# Patient Record
Sex: FEMALE | Race: BLACK OR AFRICAN AMERICAN | Employment: FULL TIME | URBAN - METROPOLITAN AREA
[De-identification: names, ages, dates, MRNs, and addresses within clinical notes are randomized per-mention and may not be internally consistent; named-entity substitution may affect disease eponyms.]

---

## 2017-01-03 ENCOUNTER — HOSPITAL ENCOUNTER (EMERGENCY)
Age: 32
Discharge: HOME | End: 2017-01-03
Attending: EMERGENCY MEDICINE
Payer: COMMERCIAL

## 2017-01-03 ENCOUNTER — PATIENT OUTREACH (OUTPATIENT)
Dept: FAMILY MEDICINE CLINIC | Age: 32
End: 2017-01-03

## 2017-01-03 ENCOUNTER — APPOINTMENT (OUTPATIENT)
Dept: GENERAL RADIOLOGY | Age: 32
End: 2017-01-03
Attending: PHYSICIAN ASSISTANT
Payer: COMMERCIAL

## 2017-01-03 VITALS
OXYGEN SATURATION: 98 % | TEMPERATURE: 98 F | BODY MASS INDEX: 30.7 KG/M2 | DIASTOLIC BLOOD PRESSURE: 68 MMHG | HEART RATE: 62 BPM | WEIGHT: 191.06 LBS | SYSTOLIC BLOOD PRESSURE: 107 MMHG | RESPIRATION RATE: 20 BRPM | HEIGHT: 66 IN

## 2017-01-03 DIAGNOSIS — R07.9 CHEST PAIN, UNSPECIFIED TYPE: Primary | ICD-10-CM

## 2017-01-03 LAB
ALBUMIN SERPL BCP-MCNC: 3.7 G/DL (ref 3.5–5)
ALBUMIN/GLOB SERPL: 1.1 {RATIO} (ref 1.1–2.2)
ALP SERPL-CCNC: 48 U/L (ref 45–117)
ALT SERPL-CCNC: 17 U/L (ref 12–78)
ANION GAP BLD CALC-SCNC: 5 MMOL/L (ref 5–15)
AST SERPL W P-5'-P-CCNC: 9 U/L (ref 15–37)
ATRIAL RATE: 69 BPM
BASOPHILS # BLD AUTO: 0 K/UL (ref 0–0.1)
BASOPHILS # BLD: 0 % (ref 0–1)
BILIRUB SERPL-MCNC: 0.3 MG/DL (ref 0.2–1)
BUN SERPL-MCNC: 8 MG/DL (ref 6–20)
BUN/CREAT SERPL: 10 (ref 12–20)
CALCIUM SERPL-MCNC: 8.6 MG/DL (ref 8.5–10.1)
CALCULATED P AXIS, ECG09: 34 DEGREES
CALCULATED R AXIS, ECG10: 9 DEGREES
CALCULATED T AXIS, ECG11: 15 DEGREES
CHLORIDE SERPL-SCNC: 105 MMOL/L (ref 97–108)
CO2 SERPL-SCNC: 29 MMOL/L (ref 21–32)
CREAT SERPL-MCNC: 0.79 MG/DL (ref 0.55–1.02)
DIAGNOSIS, 93000: NORMAL
EOSINOPHIL # BLD: 0 K/UL (ref 0–0.4)
EOSINOPHIL NFR BLD: 0 % (ref 0–7)
ERYTHROCYTE [DISTWIDTH] IN BLOOD BY AUTOMATED COUNT: 13.1 % (ref 11.5–14.5)
GLOBULIN SER CALC-MCNC: 3.5 G/DL (ref 2–4)
GLUCOSE SERPL-MCNC: 82 MG/DL (ref 65–100)
HCG UR QL: NEGATIVE
HCT VFR BLD AUTO: 36.2 % (ref 35–47)
HGB BLD-MCNC: 12.5 G/DL (ref 11.5–16)
LYMPHOCYTES # BLD AUTO: 37 % (ref 12–49)
LYMPHOCYTES # BLD: 2.6 K/UL (ref 0.8–3.5)
MCH RBC QN AUTO: 27.4 PG (ref 26–34)
MCHC RBC AUTO-ENTMCNC: 34.5 G/DL (ref 30–36.5)
MCV RBC AUTO: 79.2 FL (ref 80–99)
MONOCYTES # BLD: 0.3 K/UL (ref 0–1)
MONOCYTES NFR BLD AUTO: 4 % (ref 5–13)
NEUTS SEG # BLD: 4 K/UL (ref 1.8–8)
NEUTS SEG NFR BLD AUTO: 59 % (ref 32–75)
P-R INTERVAL, ECG05: 170 MS
PLATELET # BLD AUTO: 254 K/UL (ref 150–400)
POTASSIUM SERPL-SCNC: 3.2 MMOL/L (ref 3.5–5.1)
PROT SERPL-MCNC: 7.2 G/DL (ref 6.4–8.2)
Q-T INTERVAL, ECG07: 416 MS
QRS DURATION, ECG06: 80 MS
QTC CALCULATION (BEZET), ECG08: 445 MS
RBC # BLD AUTO: 4.57 M/UL (ref 3.8–5.2)
SODIUM SERPL-SCNC: 139 MMOL/L (ref 136–145)
TROPONIN I SERPL-MCNC: <0.04 NG/ML
VENTRICULAR RATE, ECG03: 69 BPM
WBC # BLD AUTO: 6.9 K/UL (ref 3.6–11)

## 2017-01-03 PROCEDURE — 93005 ELECTROCARDIOGRAM TRACING: CPT

## 2017-01-03 PROCEDURE — 71020 XR CHEST PA LAT: CPT

## 2017-01-03 PROCEDURE — 84484 ASSAY OF TROPONIN QUANT: CPT | Performed by: EMERGENCY MEDICINE

## 2017-01-03 PROCEDURE — 81025 URINE PREGNANCY TEST: CPT

## 2017-01-03 PROCEDURE — 80053 COMPREHEN METABOLIC PANEL: CPT | Performed by: EMERGENCY MEDICINE

## 2017-01-03 PROCEDURE — 85025 COMPLETE CBC W/AUTO DIFF WBC: CPT | Performed by: EMERGENCY MEDICINE

## 2017-01-03 PROCEDURE — 96374 THER/PROPH/DIAG INJ IV PUSH: CPT

## 2017-01-03 PROCEDURE — 99285 EMERGENCY DEPT VISIT HI MDM: CPT

## 2017-01-03 PROCEDURE — 36415 COLL VENOUS BLD VENIPUNCTURE: CPT | Performed by: EMERGENCY MEDICINE

## 2017-01-03 PROCEDURE — 74011250636 HC RX REV CODE- 250/636: Performed by: PHYSICIAN ASSISTANT

## 2017-01-03 RX ORDER — KETOROLAC TROMETHAMINE 30 MG/ML
30 INJECTION, SOLUTION INTRAMUSCULAR; INTRAVENOUS
Status: COMPLETED | OUTPATIENT
Start: 2017-01-03 | End: 2017-01-03

## 2017-01-03 RX ADMIN — KETOROLAC TROMETHAMINE 30 MG: 30 INJECTION, SOLUTION INTRAMUSCULAR at 10:32

## 2017-01-03 NOTE — ED NOTES
Patient discharged home by [facility] PAC. Work note and discharge instructions given by PAC. Patient verbalized understanding of discharge instructions.

## 2017-01-03 NOTE — DISCHARGE INSTRUCTIONS
We hope that we have addressed all of your medical concerns. The examination and treatment you received in the Emergency Department were for an emergent problem and were not intended as complete care. It is important that you follow up with your healthcare provider(s) for ongoing care. If your symptoms worsen or do not improve as expected, and you are unable to reach your usual health care provider(s), you should return to the Emergency Department. Today's healthcare is undergoing tremendous change, and patient satisfaction surveys are one of the many tools to assess the quality of medical care. You may receive a survey from the [employer] regarding your experience in the Emergency Department. I hope that your experience has been completely positive, particularly the medical care that I provided. As such, please participate in the survey; anything less than excellent does not meet my expectations or intentions. Formerly [facility], later [facility] and [address] [place] [address] participate in nationally recognized quality of care measures. If your blood pressure is greater than 120/80, as reported below, we urge that you seek medical care to address the potential of high blood pressure, commonly known as hypertension. Hypertension can be hereditary or can be caused by certain medical conditions, pain, stress, or \"white coat syndrome. \"       Please make an appointment with your health care provider(s) for follow up of your Emergency Department visit. VITALS:   Patient Vitals for the past 8 hrs:   Temp Pulse Resp BP SpO2   01/03/17 0930 98.6 °F (37 °C) 72 16 128/77 99 %          Thank you for allowing us to provide you with medical care today. We realize that you have many choices for your emergency care needs. Please choose us in the future for any continued health care needs. Jackie Houston  [address] [place] [address] 20.   Office: [phone]            Recent Results (from the past 24 hour(s))   EKG, 12 LEAD, INITIAL    Collection Time: 01/03/17  9:38 AM   Result Value Ref Range    Ventricular Rate 69 BPM    Atrial Rate 69 BPM    P-R Interval 170 ms    QRS Duration 80 ms    Q-T Interval 416 ms    QTC Calculation (Bezet) 445 ms    Calculated P Axis 34 degrees    Calculated R Axis 9 degrees    Calculated T Axis 15 degrees    Diagnosis       Normal sinus rhythm with sinus arrhythmia  No previous ECGs available     CBC WITH AUTOMATED DIFF    Collection Time: 01/03/17  9:45 AM   Result Value Ref Range    WBC 6.9 3.6 - 11.0 K/uL    RBC 4.57 3.80 - 5.20 M/uL    HGB 12.5 11.5 - 16.0 g/dL    HCT 36.2 35.0 - 47.0 %    MCV 79.2 (L) 80.0 - 99.0 FL    MCH 27.4 26.0 - 34.0 PG    MCHC 34.5 30.0 - 36.5 g/dL    RDW 13.1 11.5 - 14.5 %    PLATELET 364 330 - 078 K/uL    NEUTROPHILS 59 32 - 75 %    LYMPHOCYTES 37 12 - 49 %    MONOCYTES 4 (L) 5 - 13 %    EOSINOPHILS 0 0 - 7 %    BASOPHILS 0 0 - 1 %    ABS. NEUTROPHILS 4.0 1.8 - 8.0 K/UL    ABS. LYMPHOCYTES 2.6 0.8 - 3.5 K/UL    ABS. MONOCYTES 0.3 0.0 - 1.0 K/UL    ABS. EOSINOPHILS 0.0 0.0 - 0.4 K/UL    ABS. BASOPHILS 0.0 0.0 - 0.1 K/UL   METABOLIC PANEL, COMPREHENSIVE    Collection Time: 01/03/17  9:45 AM   Result Value Ref Range    Sodium 139 136 - 145 mmol/L    Potassium 3.2 (L) 3.5 - 5.1 mmol/L    Chloride 105 97 - 108 mmol/L    CO2 29 21 - 32 mmol/L    Anion gap 5 5 - 15 mmol/L    Glucose 82 65 - 100 mg/dL    BUN 8 6 - 20 MG/DL    Creatinine 0.79 0.55 - 1.02 MG/DL    BUN/Creatinine ratio 10 (L) 12 - 20      GFR est AA >60 >60 ml/min/1.73m2    GFR est non-AA >60 >60 ml/min/1.73m2    Calcium 8.6 8.5 - 10.1 MG/DL    Bilirubin, total 0.3 0.2 - 1.0 MG/DL    ALT 17 12 - 78 U/L    AST 9 (L) 15 - 37 U/L    Alk.  phosphatase 48 45 - 117 U/L    Protein, total 7.2 6.4 - 8.2 g/dL    Albumin 3.7 3.5 - 5.0 g/dL    Globulin 3.5 2.0 - 4.0 g/dL    A-G Ratio 1.1 1.1 - 2.2     TROPONIN I    Collection Time: 01/03/17  9:45 AM Result Value Ref Range    Troponin-I, Qt. <0.04 <0.05 ng/mL   HCG URINE, QL. - POC    Collection Time: 01/03/17 10:35 AM   Result Value Ref Range    Pregnancy test,urine (POC) NEGATIVE  NEG         Xr Chest Pa Lat    Result Date: 1/3/2017  Exam:  2 view chest Indication: Left-sided chest pain for 2 weeks PA and lateral views demonstrate normal heart size. There is no acute process in the lung fields. The osseous structures are unremarkable. Impression: No acute process. Musculoskeletal Chest Pain: Care Instructions  Your Care Instructions  Chest pain is not always a sign that something is wrong with your heart or that you have another serious problem. The doctor thinks your chest pain is caused by strained muscles or ligaments, inflamed chest cartilage, or another problem in your chest, rather than by your heart. You may need more tests to find the cause of your chest pain. Follow-up care is a key part of your treatment and safety. Be sure to make and go to all appointments, and call your doctor if you are having problems. Its also a good idea to know your test results and keep a list of the medicines you take. How can you care for yourself at home? · Take pain medicines exactly as directed. ¨ If the doctor gave you a prescription medicine for pain, take it as prescribed. ¨ If you are not taking a prescription pain medicine, ask your doctor if you can take an over-the-counter medicine. · Rest and protect the sore area. · Stop, change, or take a break from any activity that may be causing your pain or soreness. · Put ice or a cold pack on the sore area for 10 to 20 minutes at a time. Try to do this every 1 to 2 hours for the next 3 days (when you are awake) or until the swelling goes down. Put a thin cloth between the ice and your skin. · After 2 or 3 days, apply a heating pad set on low or a warm cloth to the area that hurts.  Some doctors suggest that you go back and forth between hot and cold.  · Do not wrap or tape your ribs for support. This may cause you to take smaller breaths, which could increase your risk of lung problems. · Mentholated creams such as Bengay or Icy Hot may soothe sore muscles. Follow the instructions on the package. · Follow your doctor's instructions for exercising. · Gentle stretching and massage may help you get better faster. Stretch slowly to the point just before pain begins, and hold the stretch for at least 15 to 30 seconds. Do this 3 or 4 times a day. Stretch just after you have applied heat. · As your pain gets better, slowly return to your normal activities. Any increased pain may be a sign that you need to rest a while longer. When should you call for help? Call 911 anytime you think you may need emergency care. For example, call if:  · You have chest pain or pressure. This may occur with:  ¨ Sweating. ¨ Shortness of breath. ¨ Nausea or vomiting. ¨ Pain that spreads from the chest to the neck, jaw, or one or both shoulders or arms. ¨ Dizziness or lightheadedness. ¨ A fast or uneven pulse. After calling 911, chew 1 adult-strength aspirin. Wait for an ambulance. Do not try to drive yourself. · You have sudden chest pain and shortness of breath, or you cough up blood. Call your doctor now or seek immediate medical care if:  · You have any trouble breathing. · Your chest pain gets worse. · Your chest pain occurs consistently with exercise and is relieved by rest.  Watch closely for changes in your health, and be sure to contact your doctor if:  · Your chest pain does not get better after 1 week. Where can you learn more? Go to http://janette-michelle.info/. Enter V293 in the search box to learn more about \"Musculoskeletal Chest Pain: Care Instructions. \"  Current as of: May 27, 2016  Content Version: 11.1  © 6581-0678 [employer].  Care instructions adapted under license by [employer] (which disclaims liability or warranty for this information). If you have questions about a medical condition or this instruction, always ask your healthcare professional. [place] [address] any warranty or liability for your use of this information.

## 2017-01-03 NOTE — ED TRIAGE NOTES
Left sided chest pain for a few weeks, pt stated she has been under a lot of stress, denies fever, denies cough, denies n/v, some sob, denies long vehicle rides, denies leg/calf pain

## 2017-01-03 NOTE — ED PROVIDER NOTES
HPI Comments: 31 y/o F hx anxiety, depression presents with L side cp, constant for the past 'few weeks' worsened by moving L UE, pressing area, no trauma or cough. No tobacco or OCP. Patient has hx 'murmur' since childhood, not currently followed by cardiology. No personal/fam hx dvt/pe. Patient currently taking no medications. No recent travel, no LE swelling/pain. Denies sob/exertional sob or exertional cp. Patient is a 32 y.o. female presenting with chest pain. Chest Pain (Angina)           Past Medical History:   Diagnosis Date    Heart murmur     History of      Preeclampsia        Past Surgical History:   Procedure Laterality Date    Hx wisdom teeth extraction      Hx  section      Hx tubal ligation Bilateral 2015         Family History:   Problem Relation Age of Onset    Hypertension Mother     Diabetes Father     Arthritis-osteo Maternal Aunt     Stroke Maternal Grandmother        Social History     Social History    Marital status:      Spouse name: N/A    Number of children: N/A    Years of education: N/A     Occupational History    Not on file. Social History Main Topics    Smoking status: Never Smoker    Smokeless tobacco: Never Used    Alcohol use 0.0 oz/week     1 - 2 Standard drinks or equivalent per week      Comment: RARE    Drug use: No    Sexual activity: Yes     Partners: Male     Birth control/ protection: None, Pill, Surgical      Comment:      Other Topics Concern    Not on file     Social History Narrative         ALLERGIES: Review of patient's allergies indicates no known allergies. Review of Systems   Cardiovascular: Positive for chest pain. All other systems reviewed and are negative.       Vitals:    17 0930   BP: 128/77   Pulse: 72   Resp: 16   Temp: 98.6 °F (37 °C)   SpO2: 99%   Weight: 86.7 kg (191 lb 1 oz)   Height: 5' 6\" (1.676 m)            Physical Exam   Constitutional: She is oriented to person, place, and time. She appears well-developed and well-nourished. HENT:   Head: Normocephalic and atraumatic. Eyes: Conjunctivae and EOM are normal.   Neck: Normal range of motion. Neck supple. Cardiovascular: Normal rate and regular rhythm. Murmur heard. Diastolic murmur is present with a grade of 2/6   Pulmonary/Chest: Effort normal and breath sounds normal. She exhibits tenderness. Normal visual inspection    Abdominal: Soft. Bowel sounds are normal. There is no tenderness. Musculoskeletal: Normal range of motion. She exhibits no edema or tenderness. Neurological: She is alert and oriented to person, place, and time. Skin: Skin is warm and dry. No rash noted. No erythema. Nursing note and vitals reviewed. MDM  Number of Diagnoses or Management Options  Chest pain, unspecified type:   Diagnosis management comments: 33 y/o F constant cp x'weeks', reproducible - ekg/labs/cxr/toradol    Wells/PERC - neg    Sx reproducible, ttp/movement L UE, unremarkable visual inspection, no pleuritic component    Sx improved following toradol    W/u reassuring, cp constant x weeks, no associated sx, ok for home with cardiology f/u given murmur that has not been re evaluated although appears benign     ED EKG interpretation:  Rhythm: normal sinus rhythm; and regular . Rate (approx.): 69; Axis: normal; P wave: normal; QRS interval: normal ; ST/T wave: normal; in  Lead: ; Other findings: normal. This EKG was interpreted by DEBBIE Villegas,ED Provider.       ED Course       Procedures

## 2017-01-03 NOTE — LETTER
1/4/2017 3:04 PM 
 
[facility]. [facility]y [facility] [phone] Dear Ms. Jakub Green: 
 
I am a Nurse Navigator working with your physician's office. Part of my job is to follow-up with patients who have been in the emergency department or hospital to see how they are feeling, answer any questions they may have about their visit and also make sure they have a follow-up appointment to see their primary care doctor. I can also provide resources to patient that have other needs. Please call me if you need assistance with affording food, medications, if you need transportation to physician appointments or if there are other needs you might have. Thank you for allowing me to participate in your care. Sincerely, 
 
 
 
Boy Gómez. Kalyani Gibson, BSN RNC Nurse Navigator [phone]

## 2017-01-03 NOTE — PROGRESS NOTES
Per LPN in clinic the pt. Called on call this morning to report left sided CP and that she was going to the ER at [facility]. Per ER record, the pt. Had left sided CP x 3 weeks. Troponin, EKG and other tests wnl. DX: chest wall pain and given the name of Dr. Uzma Montero to follow up with. NN will contact the pt. Regarding f/u in this office. Benedict Pierson. BROOKLYNN Albarran       1/4/17 1500 [facility] with ED visit as above. Dx with Chest Pain unspecified. W/U negative, EKG wnl, ok to discharge per MD to home with Cardiology follow up. Given Toradol IM x 1. To follow up with Dr. Uzma Montero, Cardiology. 1503: Call to pt. No answer, no answer machine. Sending letter. Benedict Pierson.  BROOKLYNN Albarran

## 2017-01-03 NOTE — LETTER
NOTIFICATION RETURN TO WORK / SCHOOL 
 
1/3/2017 10:51 AM 
 
Ms. Kelsey Ellington [facility] 7 [phone] To Whom It May Concern: 
 
Kelsey Ellington is currently under the care of [facility]. Please excuse from work 1/3/16. Sincerely, 
 
 
John Jo

## 2017-02-15 ENCOUNTER — HOSPITAL ENCOUNTER (EMERGENCY)
Age: 32
Discharge: HOME | End: 2017-02-15
Attending: EMERGENCY MEDICINE
Payer: COMMERCIAL

## 2017-02-15 ENCOUNTER — APPOINTMENT (OUTPATIENT)
Dept: GENERAL RADIOLOGY | Age: 32
End: 2017-02-15
Attending: EMERGENCY MEDICINE
Payer: COMMERCIAL

## 2017-02-15 VITALS
TEMPERATURE: 98.4 F | DIASTOLIC BLOOD PRESSURE: 91 MMHG | SYSTOLIC BLOOD PRESSURE: 136 MMHG | BODY MASS INDEX: 32.36 KG/M2 | WEIGHT: 194.22 LBS | HEIGHT: 65 IN | OXYGEN SATURATION: 96 % | HEART RATE: 64 BPM | RESPIRATION RATE: 18 BRPM

## 2017-02-15 DIAGNOSIS — R07.89 ATYPICAL CHEST PAIN: Primary | ICD-10-CM

## 2017-02-15 LAB
ALBUMIN SERPL BCP-MCNC: 3.8 G/DL (ref 3.5–5)
ALBUMIN/GLOB SERPL: 1 {RATIO} (ref 1.1–2.2)
ALP SERPL-CCNC: 60 U/L (ref 45–117)
ALT SERPL-CCNC: 13 U/L (ref 12–78)
ANION GAP BLD CALC-SCNC: 6 MMOL/L (ref 5–15)
AST SERPL W P-5'-P-CCNC: 11 U/L (ref 15–37)
BASOPHILS # BLD AUTO: 0 K/UL (ref 0–0.1)
BASOPHILS # BLD: 1 % (ref 0–1)
BILIRUB SERPL-MCNC: 0.2 MG/DL (ref 0.2–1)
BUN SERPL-MCNC: 11 MG/DL (ref 6–20)
BUN/CREAT SERPL: 16 (ref 12–20)
CALCIUM SERPL-MCNC: 8.6 MG/DL (ref 8.5–10.1)
CHLORIDE SERPL-SCNC: 105 MMOL/L (ref 97–108)
CK SERPL-CCNC: 97 U/L (ref 26–192)
CO2 SERPL-SCNC: 30 MMOL/L (ref 21–32)
CREAT SERPL-MCNC: 0.7 MG/DL (ref 0.55–1.02)
D DIMER PPP FEU-MCNC: 0.31 MG/L FEU (ref 0–0.65)
EOSINOPHIL # BLD: 0.1 K/UL (ref 0–0.4)
EOSINOPHIL NFR BLD: 1 % (ref 0–7)
ERYTHROCYTE [DISTWIDTH] IN BLOOD BY AUTOMATED COUNT: 13.1 % (ref 11.5–14.5)
GLOBULIN SER CALC-MCNC: 3.7 G/DL (ref 2–4)
GLUCOSE SERPL-MCNC: 75 MG/DL (ref 65–100)
HCG UR QL: NEGATIVE
HCT VFR BLD AUTO: 32.8 % (ref 35–47)
HGB BLD-MCNC: 11.4 G/DL (ref 11.5–16)
LYMPHOCYTES # BLD AUTO: 37 % (ref 12–49)
LYMPHOCYTES # BLD: 2 K/UL (ref 0.8–3.5)
MCH RBC QN AUTO: 27.3 PG (ref 26–34)
MCHC RBC AUTO-ENTMCNC: 34.8 G/DL (ref 30–36.5)
MCV RBC AUTO: 78.7 FL (ref 80–99)
MONOCYTES # BLD: 0.3 K/UL (ref 0–1)
MONOCYTES NFR BLD AUTO: 6 % (ref 5–13)
NEUTS SEG # BLD: 3 K/UL (ref 1.8–8)
NEUTS SEG NFR BLD AUTO: 55 % (ref 32–75)
PLATELET # BLD AUTO: 230 K/UL (ref 150–400)
POTASSIUM SERPL-SCNC: 3.8 MMOL/L (ref 3.5–5.1)
PROT SERPL-MCNC: 7.5 G/DL (ref 6.4–8.2)
RBC # BLD AUTO: 4.17 M/UL (ref 3.8–5.2)
SODIUM SERPL-SCNC: 141 MMOL/L (ref 136–145)
TROPONIN I SERPL-MCNC: <0.04 NG/ML
WBC # BLD AUTO: 5.4 K/UL (ref 3.6–11)

## 2017-02-15 PROCEDURE — 71020 XR CHEST PA LAT: CPT

## 2017-02-15 PROCEDURE — 82550 ASSAY OF CK (CPK): CPT | Performed by: EMERGENCY MEDICINE

## 2017-02-15 PROCEDURE — 80053 COMPREHEN METABOLIC PANEL: CPT | Performed by: EMERGENCY MEDICINE

## 2017-02-15 PROCEDURE — 93005 ELECTROCARDIOGRAM TRACING: CPT

## 2017-02-15 PROCEDURE — 99284 EMERGENCY DEPT VISIT MOD MDM: CPT

## 2017-02-15 PROCEDURE — 36415 COLL VENOUS BLD VENIPUNCTURE: CPT | Performed by: EMERGENCY MEDICINE

## 2017-02-15 PROCEDURE — 81025 URINE PREGNANCY TEST: CPT

## 2017-02-15 PROCEDURE — 85025 COMPLETE CBC W/AUTO DIFF WBC: CPT | Performed by: EMERGENCY MEDICINE

## 2017-02-15 PROCEDURE — 74011250637 HC RX REV CODE- 250/637: Performed by: EMERGENCY MEDICINE

## 2017-02-15 PROCEDURE — 84484 ASSAY OF TROPONIN QUANT: CPT | Performed by: EMERGENCY MEDICINE

## 2017-02-15 PROCEDURE — 85379 FIBRIN DEGRADATION QUANT: CPT | Performed by: EMERGENCY MEDICINE

## 2017-02-15 RX ORDER — HYDROCODONE BITARTRATE AND ACETAMINOPHEN 5; 325 MG/1; MG/1
2 TABLET ORAL
Status: COMPLETED | OUTPATIENT
Start: 2017-02-15 | End: 2017-02-15

## 2017-02-15 RX ORDER — NAPROXEN 375 MG/1
375 TABLET ORAL 2 TIMES DAILY WITH MEALS
Qty: 14 TAB | Refills: 0 | Status: SHIPPED | OUTPATIENT
Start: 2017-02-15 | End: 2017-02-20 | Stop reason: ALTCHOICE

## 2017-02-15 RX ORDER — HYDROCODONE BITARTRATE AND ACETAMINOPHEN 5; 325 MG/1; MG/1
1 TABLET ORAL
Qty: 12 TAB | Refills: 0 | Status: SHIPPED | OUTPATIENT
Start: 2017-02-15 | End: 2018-01-16

## 2017-02-15 RX ORDER — METAXALONE 800 MG/1
800 TABLET ORAL 3 TIMES DAILY
Qty: 15 TAB | Refills: 0 | Status: SHIPPED | OUTPATIENT
Start: 2017-02-15 | End: 2017-02-20

## 2017-02-15 RX ADMIN — HYDROCODONE BITARTRATE AND ACETAMINOPHEN 2 TABLET: 5; 325 TABLET ORAL at 18:07

## 2017-02-15 NOTE — ED NOTES
Assumed care of patient from triage. Patient arrives with chest pain to left chest and under left breast that radiates to back. Patient reports that the chest pain woke her up from sleep. Pain increased with movement of left arm, pain also noted with palpation. Patient took ibuprofen without relief of symptoms. Denies recent travel, N/V. Patient resting comfortably in reyes stretcher at this time, MD bedside with patient.

## 2017-02-15 NOTE — LETTER
[address] 
[facility] EMERGENCY DEPT 
[address] [place] [address] [phone] [phone] Work/School Note Date: 2/15/2017 To Whom It May concern: 
 
Megan Rodriguez was seen and treated today in the emergency room by the following provider(s): 
Attending Provider: Sammi Carrero. Courtney Walton MD. Megan Rodriguez may return to work on 2/16/17. Sincerely, Sammi Carrero.  Courtney Walton MD

## 2017-02-16 ENCOUNTER — PATIENT OUTREACH (OUTPATIENT)
Dept: FAMILY MEDICINE CLINIC | Age: 32
End: 2017-02-16

## 2017-02-16 LAB
ATRIAL RATE: 82 BPM
CALCULATED P AXIS, ECG09: 50 DEGREES
CALCULATED R AXIS, ECG10: 25 DEGREES
CALCULATED T AXIS, ECG11: 35 DEGREES
DIAGNOSIS, 93000: NORMAL
P-R INTERVAL, ECG05: 158 MS
Q-T INTERVAL, ECG07: 384 MS
QRS DURATION, ECG06: 82 MS
QTC CALCULATION (BEZET), ECG08: 448 MS
VENTRICULAR RATE, ECG03: 82 BPM

## 2017-02-16 NOTE — LETTER
2/16/2017 2:31 PM 
 
Ms. Donal [facility] [phone] Dear Elizabeth Mary, 
 
I am a Nurse Navigator working with your physician's office. Part of my job is to follow up with patients who have been in the emergency department or hospital to see how they are feeling, answer any questions they may have about their visit and also make sure they have a follow-up appointment to see their primary care doctor and/or specialist. 
I saw that you had gone to [facility] 2/15 and wanted to make sure you were doing ok and that you had scheduled your follow up with one of our providers since Hope Nicolas has left our practice. Also wanted to make sure you are making an appt to see cardiologist as well per ED recommendations. Please call if I can be of any assistance, you can reach me at [facility], ext [phone]. Thank you for allowing us to participate in your care!  
 
 
Sincerely, 
 
 
Chino Wallace RN

## 2017-02-16 NOTE — PROGRESS NOTES
NNTOCED    [facility] Diogo Melida to [facility] ED 2/15 c/o chest pain on the left side that radiates around to her back. Pain worsens with moving arm, pain with palpitation. Ibuprofen not helping. EKG done-normal. Rx- Lortab 5/325 #12, sig one q 4 hours prn and Rx-Naprosyn 375 #14, one bid x 7 days and Skelaxin 800mg #10, sig one bid x 5 days. Advised f/u with pcp and needs to see cardio. Called patient, KAUSHAL on  to call NN back- contact information provided. Will continue to try and reach by phone. Letter sent as well. Patient did call back- says she feels about the same, ED said she had Pleurisy. Has not gotten the Lortab but says taking the Naprosyn and Skelaxin made her dizzy,nauseated and drowsy today. Took both at same time. Suggested she split them up. Trying taking one with dinner and see how she does. Was patient of Joseph Nieves, says works all day, suggested she call at 7am to schedule evening appointment. Says she will and thanked me for the call.

## 2017-02-16 NOTE — ED NOTES
Bedside and Verbal shift change report given to Everton Casarez RN (oncoming nurse) by Clarissa Iniguez RN (offgoing nurse). Report included the following information SBAR and ED Summary.

## 2017-02-16 NOTE — DISCHARGE INSTRUCTIONS
Chest Pain: Care Instructions  Your Care Instructions  There are many things that can cause chest pain. Some are not serious and will get better on their own in a few days. But some kinds of chest pain need more testing and treatment. Your doctor may have recommended a follow-up visit in the next 8 to 12 hours. If you are not getting better, you may need more tests or treatment. Even though your doctor has released you, you still need to watch for any problems. The doctor carefully checked you, but sometimes problems can develop later. If you have new symptoms or if your symptoms do not get better, get medical care right away. If you have worse or different chest pain or pressure that lasts more than 5 minutes or you passed out (lost consciousness), call 911 or seek other emergency help right away. A medical visit is only one step in your treatment. Even if you feel better, you still need to do what your doctor recommends, such as going to all suggested follow-up appointments and taking medicines exactly as directed. This will help you recover and help prevent future problems. How can you care for yourself at home? · Rest until you feel better. · Take your medicine exactly as prescribed. Call your doctor if you think you are having a problem with your medicine. · Do not drive after taking a prescription pain medicine. When should you call for help? Call 911 if:  · You passed out (lost consciousness). · You have severe difficulty breathing. · You have symptoms of a heart attack. These may include:  ¨ Chest pain or pressure, or a strange feeling in your chest.  ¨ Sweating. ¨ Shortness of breath. ¨ Nausea or vomiting. ¨ Pain, pressure, or a strange feeling in your back, neck, jaw, or upper belly or in one or both shoulders or arms. ¨ Lightheadedness or sudden weakness. ¨ A fast or irregular heartbeat.   After you call 911, the  may tell you to chew 1 adult-strength or 2 to 4 low-dose aspirin. Wait for an ambulance. Do not try to drive yourself. Call your doctor today if:  · You have any trouble breathing. · Your chest pain gets worse. · You are dizzy or lightheaded, or you feel like you may faint. · You are not getting better as expected. · You are having new or different chest pain. Where can you learn more? Go to http://janette-michelle.info/. Enter A120 in the search box to learn more about \"Chest Pain: Care Instructions. \"  Current as of: May 27, 2016  Content Version: 11.1  © 2466-2178 [employer]. Care instructions adapted under license by [employer] (which disclaims liability or warranty for this information). If you have questions about a medical condition or this instruction, always ask your healthcare professional. [address] any warranty or liability for your use of this information.

## 2017-02-16 NOTE — PATIENT INSTRUCTIONS
Pleurisy: Care Instructions  Your Care Instructions  Pleurisy is inflammation of the tissue that lines the inside of the chest and covers the lungs (pleura). Pleurisy is often caused by an infection, usually a virus. It also can be caused by other health problems, such as pneumonia or lupus. Pleurisy can cause sharp chest pain that gets worse when you cough or take a deep breath. You may need more tests to find out what is causing your pleurisy. Treatment depends on the cause. Pleurisy may come and go for a few days, or it may continue if the cause has not been treated. Home treatment can help ease symptoms. Follow-up care is a key part of your treatment and safety. Be sure to make and go to all appointments, and call your doctor if you are having problems. Its also a good idea to know your test results and keep a list of the medicines you take. How can you care for yourself at home? · Take an over-the-counter pain medicine, such as acetaminophen (Tylenol), ibuprofen (Advil, Motrin), or naproxen (Aleve). Read and follow all instructions on the label. · Do not take two or more pain medicines at the same time unless the doctor told you to. Many pain medicines have acetaminophen, which is Tylenol. Too much acetaminophen (Tylenol) can be harmful. · If your doctor prescribed antibiotics, take them as directed. Do not stop taking them just because you feel better. You need to take the full course of antibiotics. · Take cough medicine as directed if your doctor recommends it. · Avoid activities that make the pain worse. When should you call for help? Call 911 anytime you think you may need emergency care. For example, call if:  · You have severe trouble breathing. · You have severe chest pain. · You passed out (lost consciousness). Call your doctor now or seek immediate medical care if:  · You have a new or higher fever.   Watch closely for changes in your health, and be sure to contact your doctor if:  · You begin to cough up yellow or green mucus. · You cough up blood. · Your symptoms are not better in 3 or 4 days. Where can you learn more? Go to http://janette-michelle.info/. Enter F346 in the search box to learn more about \"Pleurisy: Care Instructions. \"  Current as of: May 23, 2016  Content Version: 11.1  © 7353-4311 [employer]. Care instructions adapted under license by [employer] (which disclaims liability or warranty for this information). If you have questions about a medical condition or this instruction, always ask your healthcare professional. [address] any warranty or liability for your use of this information.

## 2017-02-16 NOTE — ED PROVIDER NOTES
HPI Comments: Yaz Curtis is a 32 y.o. female, pmhx significant for heart murmur,  who presents ambulatory to the ED c/o sudden onset, constant,  L sided CP that radiates to the L upper back x today. Pain is exacerbated by the action of sitting up, deep inspiration, exertion and lifting her L arm. Pt reports that her pain began as she was getting up this morning. She continued to work all day despite her pain and endorses taking ibuprofen 2 hours PTA with no relief. Pt specifically denies other medical problems, using depo provera, having an IUD, leg swelling, recent travel, cough, fever, LOC, nausea, vomiting, pain radiating to the jaw, CP w/ walking, or other symptoms. PCP: Luis Mcginnis,       Social Hx: -tobacco, +EtOH (1-2 standard drinks/week), -Illicit Drugs   FHx: no pertinent family hx   Medication Allergies: none      There are no other complaints, changes, or physical findings at this time. The history is provided by the patient. Past Medical History:   Diagnosis Date    Heart murmur     History of      Preeclampsia        Past Surgical History:   Procedure Laterality Date    Hx wisdom teeth extraction      Hx  section      Hx tubal ligation Bilateral 2015         Family History:   Problem Relation Age of Onset    Hypertension Mother     Diabetes Father     Arthritis-osteo Maternal Aunt     Stroke Maternal Grandmother        Social History     Social History    Marital status:      Spouse name: N/A    Number of children: N/A    Years of education: N/A     Occupational History    Not on file.      Social History Main Topics    Smoking status: Never Smoker    Smokeless tobacco: Never Used    Alcohol use 0.0 oz/week     1 - 2 Standard drinks or equivalent per week      Comment: RARE    Drug use: No    Sexual activity: Yes     Partners: Male     Birth control/ protection: None, Pill, Surgical      Comment:      Other Topics Concern    Not on file     Social History Narrative         ALLERGIES: Review of patient's allergies indicates no known allergies. Review of Systems   Constitutional: Negative for chills and fever. HENT: Negative for congestion. Eyes: Negative for visual disturbance. Respiratory: Negative for cough and chest tightness. Cardiovascular: Positive for chest pain (L sided radaiting to the L upper back). Negative for leg swelling. CP does not radiate to jaw   Gastrointestinal: Negative for abdominal pain, nausea and vomiting. Endocrine: Negative for polyuria. Genitourinary: Negative for dysuria and frequency. Musculoskeletal: Negative for myalgias. Skin: Negative for color change. Allergic/Immunologic: Negative for immunocompromised state. Neurological: Negative for syncope and numbness. All other systems reviewed and are negative. Patient Vitals for the past 12 hrs:   Temp Pulse Resp BP SpO2   02/15/17 1806 - 64 18 (!) 136/91 96 %   02/15/17 1558 98.4 °F (36.9 °C) 79 18 143/88 98 %       Physical Exam   Nursing note and vitals reviewed.   General appearance: non-toxic, NAD  Eyes: PERRL, EOMI, conjunctiva normal, anicteric sclera  HEENT: mucous membranes moist, oropharynx is benign   Pulmonary: clear to auscultation bilaterally  Cardiac: normal rate and regular rhythm, no murmurs, gallops, or rubs, 2+ peripheral pulses, 2+ radial/distal pulses  Abdomen: soft, nontender, nondistended, bowel sounds present  MSK: no lower extremity edema, ttp posterior L thorax and anterior L chest wall that is reproducible with L arm raise and thoracic rotation   Neuro: Alert, answers questions appropriately    MDM  Number of Diagnoses or Management Options  Atypical chest pain:   Diagnosis management comments: DDx: msk CP, pleurisy, pneumonia, bronchitis, low suspicion for ACS, PE (PERC neg & d dimer neg), myopericarditis        Amount and/or Complexity of Data Reviewed  Clinical lab tests: reviewed and ordered  Tests in the radiology section of CPT®: ordered  Tests in the medicine section of CPT®: reviewed and ordered  Review and summarize past medical records: yes  Independent visualization of images, tracings, or specimens: yes    Patient Progress  Patient progress: stable        Procedures   EKG interpretation: (Preliminary) 1546  Rhythm: normal sinus rhythm; and regular . Rate (approx.): 82 bpm; Axis: normal; ID interval: normal; QRS interval: normal ; ST/T wave: no FLORECITA/STD; Written by Kristel Buenrostro ED Scribe as dictated by Yanely Purcell MD    LABORATORY TESTS:  Recent Results (from the past 12 hour(s))   EKG, 12 LEAD, INITIAL    Collection Time: 02/15/17  3:46 PM   Result Value Ref Range    Ventricular Rate 82 BPM    Atrial Rate 82 BPM    P-R Interval 158 ms    QRS Duration 82 ms    Q-T Interval 384 ms    QTC Calculation (Bezet) 448 ms    Calculated P Axis 50 degrees    Calculated R Axis 25 degrees    Calculated T Axis 35 degrees    Diagnosis       Normal sinus rhythm  Normal ECG  When compared with ECG of 03-JAN-2017 09:38,  No significant change was found     CBC WITH AUTOMATED DIFF    Collection Time: 02/15/17  5:49 PM   Result Value Ref Range    WBC 5.4 3.6 - 11.0 K/uL    RBC 4.17 3.80 - 5.20 M/uL    HGB 11.4 (L) 11.5 - 16.0 g/dL    HCT 32.8 (L) 35.0 - 47.0 %    MCV 78.7 (L) 80.0 - 99.0 FL    MCH 27.3 26.0 - 34.0 PG    MCHC 34.8 30.0 - 36.5 g/dL    RDW 13.1 11.5 - 14.5 %    PLATELET 832 249 - 429 K/uL    NEUTROPHILS 55 32 - 75 %    LYMPHOCYTES 37 12 - 49 %    MONOCYTES 6 5 - 13 %    EOSINOPHILS 1 0 - 7 %    BASOPHILS 1 0 - 1 %    ABS. NEUTROPHILS 3.0 1.8 - 8.0 K/UL    ABS. LYMPHOCYTES 2.0 0.8 - 3.5 K/UL    ABS. MONOCYTES 0.3 0.0 - 1.0 K/UL    ABS. EOSINOPHILS 0.1 0.0 - 0.4 K/UL    ABS.  BASOPHILS 0.0 0.0 - 0.1 K/UL   METABOLIC PANEL, COMPREHENSIVE    Collection Time: 02/15/17  5:49 PM   Result Value Ref Range    Sodium 141 136 - 145 mmol/L    Potassium 3.8 3.5 - 5.1 mmol/L Chloride 105 97 - 108 mmol/L    CO2 30 21 - 32 mmol/L    Anion gap 6 5 - 15 mmol/L    Glucose 75 65 - 100 mg/dL    BUN 11 6 - 20 MG/DL    Creatinine 0.70 0.55 - 1.02 MG/DL    BUN/Creatinine ratio 16 12 - 20      GFR est AA >60 >60 ml/min/1.73m2    GFR est non-AA >60 >60 ml/min/1.73m2    Calcium 8.6 8.5 - 10.1 MG/DL    Bilirubin, total 0.2 0.2 - 1.0 MG/DL    ALT (SGPT) 13 12 - 78 U/L    AST (SGOT) 11 (L) 15 - 37 U/L    Alk. phosphatase 60 45 - 117 U/L    Protein, total 7.5 6.4 - 8.2 g/dL    Albumin 3.8 3.5 - 5.0 g/dL    Globulin 3.7 2.0 - 4.0 g/dL    A-G Ratio 1.0 (L) 1.1 - 2.2     D DIMER    Collection Time: 02/15/17  5:49 PM   Result Value Ref Range    D-dimer 0.31 0.00 - 0.65 mg/L FEU   CK W/ REFLX CKMB    Collection Time: 02/15/17  5:49 PM   Result Value Ref Range    CK 97 26 - 192 U/L   TROPONIN I    Collection Time: 02/15/17  5:49 PM   Result Value Ref Range    Troponin-I, Qt. <0.04 <0.05 ng/mL   HCG URINE, QL. - POC    Collection Time: 02/15/17  5:53 PM   Result Value Ref Range    Pregnancy test,urine (POC) NEGATIVE  NEG         IMAGING RESULTS:  CXR Results  (Last 48 hours)               02/15/17 1830  XR CHEST PA LAT Final result    Impression:  IMPRESSION:    No acute cardiopulmonary disease radiographically. .  . Narrative:          INDICATION:  CP. EXAM: 2 VIEW CHEST RADIOGRAPH. COMPARISON: 1/3/2017. FINDINGS: Frontal and lateral views of the chest show clear lungs. . The heart,   mediastinum and pulmonary vasculature are stable. The bony thorax is   unremarkable for age. ..                 MEDICATIONS GIVEN:  Medications   HYDROcodone-acetaminophen (NORCO) 5-325 mg per tablet 2 Tab (2 Tabs Oral Given 2/15/17 7787)       IMPRESSION:  1. Atypical chest pain        PLAN:  1.    Discharge Medication List as of 2/15/2017  7:40 PM      START taking these medications    Details   HYDROcodone-acetaminophen (LORTAB 5-325) 5-325 mg per tablet Take 1 Tab by mouth every four (4) hours as needed for Pain (breakthrough pain). Max Daily Amount: 6 Tabs. Indications: causes drowsiness;do not drink,drive, or use machinery while taking; take with a stool softener, Print, Disp-12 Tab, R-0      naproxen (NAPROSYN) 375 mg tablet Take 1 Tab by mouth two (2) times daily (with meals) for 7 days. Indications: Pain, take with food or milk, Normal, Disp-14 Tab, R-0      metaxalone (SKELAXIN) 800 mg tablet Take 1 Tab by mouth three (3) times daily for 5 days. Indications: may cause drowsiness;do not drink,drive, or use machinery while taking., Normal, Disp-15 Tab, R-0           2. Follow-up Information     Follow up With Details Comments Contact Info    Renetta Jfefries DO Schedule an appointment as soon as possible for a visit in 3 days  [address] [facility] [address] [phone]      [facility] EMERGENCY DEPT Go in 1 day If symptoms worsen [address] [facility] [id number]  [facility] Schedule an appointment as soon as possible for a visit in 2 days As needed WITH CARDIOLOGY [address]  [address]          Return to ED if worse   DISCHARGE NOTE:  8:51 PM  The patient's results have been reviewed with family and/or caregiver. They verbally convey their understanding and agreement of the patient's signs, symptoms, diagnosis, treatment, and prognosis. They additionally agree to follow up as recommended in the discharge instructions or to return to the Emergency Room should the patient's condition change prior to their follow-up appointment. The family and/or caregiver verbally agrees with the care-plan and all of their questions have been answered. The discharge instructions have also been provided to the them along with educational information regarding the patient's diagnosis and a list of reasons why the patient would want to return to the ER prior to their follow-up appointment should their condition change.   Written by Martin Leal ED Scribe, as dictated by Chivo Mcneil MD.    This note is prepared by Martin Leal acting as scribe for MD Chivo Saavedra MD : The scribe's documentation has been prepared under my direction and personally reviewed by me in its entirety. I confirm that the note above accurately reflects all work, treatment, procedures, and medical decision making performed by me.

## 2017-02-20 ENCOUNTER — OFFICE VISIT (OUTPATIENT)
Dept: FAMILY MEDICINE CLINIC | Age: 32
End: 2017-02-20

## 2017-02-20 VITALS
RESPIRATION RATE: 18 BRPM | WEIGHT: 193 LBS | HEIGHT: 65 IN | TEMPERATURE: 98.1 F | BODY MASS INDEX: 32.15 KG/M2 | OXYGEN SATURATION: 98 % | HEART RATE: 60 BPM | DIASTOLIC BLOOD PRESSURE: 70 MMHG | SYSTOLIC BLOOD PRESSURE: 116 MMHG

## 2017-02-20 DIAGNOSIS — R07.89 ATYPICAL CHEST PAIN: Primary | ICD-10-CM

## 2017-02-20 RX ORDER — DICLOFENAC SODIUM 75 MG/1
75 TABLET, DELAYED RELEASE ORAL 2 TIMES DAILY
Qty: 30 TAB | Refills: 0 | Status: SHIPPED | OUTPATIENT
Start: 2017-02-20 | End: 2018-01-16

## 2017-02-20 NOTE — LETTER
NOTIFICATION RETURN TO WORK / SCHOOL 
 
2/20/2017 10:43 AM 
 
Ms. José Heath [facility] [phone] To Whom It May Concern: 
 
José Heath is currently under the care of ERNIE Calvo. She was seen in the office today for follow up appointment. If there are questions or concerns please have the patient contact our office.  
 
 
 
Sincerely, 
 
 
Clair Quezada NP

## 2017-02-20 NOTE — MR AVS SNAPSHOT
Visit Information Date & Time Provider Department Dept. Phone Encounter #  
 2/20/2017 10:15 AM Reyna Izquierdo  [address] [place] [phone] [id number] Upcoming Health Maintenance Date Due DTaP/Tdap/Td series (1 - Tdap) 7/8/2006 PAP AKA CERVICAL CYTOLOGY 7/8/2006 Allergies as of 2/20/2017  Review Complete On: 2/20/2017 By: Reyna Izquierdo NP No Known Allergies Current Immunizations  Never Reviewed No immunizations on file. Not reviewed this visit You Were Diagnosed With   
  
 Codes Comments Atypical chest pain    -  Primary ICD-10-CM: R07.89 ICD-9-CM: 786.59 Vitals BP Pulse Temp Resp Height(growth percentile) Weight(growth percentile) 116/70 (BP 1 Location: Left arm, BP Patient Position: Sitting) 60 98.1 °F (36.7 °C) (Oral) 18 5' 5\" (1.651 m) 193 lb (87.5 kg) LMP SpO2 BMI OB Status Smoking Status 02/09/2017 (Approximate) 98% 32.12 kg/m2 Having regular periods Never Smoker Vitals History BMI and BSA Data Body Mass Index Body Surface Area  
 32.12 kg/m 2 2 m 2 Preferred Pharmacy Pharmacy Name Phone [facility] [address] [phone] Your Updated Medication List  
  
   
This list is accurate as of: 2/20/17 10:44 AM.  Always use your most recent med list.  
  
  
  
  
 diclofenac EC 75 mg EC tablet Commonly known as:  VOLTAREN Take 1 Tab by mouth two (2) times a day. HYDROcodone-acetaminophen 5-325 mg per tablet Commonly known as:  LORTAB 5-325 Take 1 Tab by mouth every four (4) hours as needed for Pain (breakthrough pain). Max Daily Amount: 6 Tabs. Indications: causes drowsiness;do not drink,drive, or use machinery while taking; take with a stool softener  
  
 metaxalone 800 mg tablet Commonly known as:  SKELAXIN  
 Take 1 Tab by mouth three (3) times daily for 5 days. Indications: may cause drowsiness;do not drink,drive, or use machinery while taking. Prescriptions Sent to Pharmacy Refills  
 diclofenac EC (VOLTAREN) 75 mg EC tablet 0 Sig: Take 1 Tab by mouth two (2) times a day. Class: Normal  
 Pharmacy: [facility] [address] [place] [address], [address]  #: [phone] Route: Oral  
  
Introducing [facility] SERVICES! Dear Elina Croft: Thank you for requesting a [employer] account. Our records indicate that you already have an active [employer] account. You can access your account anytime at [email or web address] Did you know that you can access your hospital and ER discharge instructions at any time in [employer]? You can also review all of your test results from your hospital stay or ER visit. Additional Information If you have questions, please visit the Frequently Asked Questions section of the [employer] website at [email or web address] Remember, [employer] is NOT to be used for urgent needs. For medical emergencies, dial 911. Now available from your iPhone and Android! Please provide this summary of care documentation to your next provider. Your primary care clinician is listed as Tejas Meyer. If you have any questions after today's visit, please call [phone].

## 2017-02-20 NOTE — PROGRESS NOTES
Chief Complaint   Patient presents with   [facility] Follow Up     went to [facility] 02/15/17 for chest pain, dx pleurisy and pneumonia. Given Lortab,Naproxen, and Skelaxin . Making her sick on her stomach and pain continues       Reviewed Record in preparation for visit and have obtained necessary documentation. Identified pt with two pt identifiers (Name @ )    Health Maintenance Due   Topic    DTaP/Tdap/Td series (1 - Tdap)    PAP AKA CERVICAL CYTOLOGY     INFLUENZA AGE 9 TO ADULT          1. Have you been to the ER, urgent care clinic since your last visit? Hospitalized since your last visit?see todays encounter    2. Have you seen or consulted any other health care providers outside of the [address] [place] [address] since your last visit? Include any pap smears or colon screening.  No

## 2017-02-20 NOTE — PROGRESS NOTES
HISTORY OF PRESENT ILLNESS  Colby Villalpando is a 32 y.o. female. HPI  Follow up ED visit. Patient seen at [facility] ED on 2/15/17 with c/o left side pain radiating to left anterior chest and left mid back. Records reviewed and summarized as follows:  Chest xray was negative and EKG normal without acute changes. She was diagnosed with pleurisy and put on norco, naproxen and skelaxin. Tried to take medications but they made her nauseous. Continues to have pain but sx have improved. Aggravated by deep breaths and upper arm movements. Began with cough a few days ago which exacerbates sx as well. Patient Active Problem List   Diagnosis Code    Mixed anxiety and depressive disorder F41.8     Current Outpatient Prescriptions   Medication Sig    diclofenac EC (VOLTAREN) 75 mg EC tablet Take 1 Tab by mouth two (2) times a day.  HYDROcodone-acetaminophen (LORTAB 5-325) 5-325 mg per tablet Take 1 Tab by mouth every four (4) hours as needed for Pain (breakthrough pain). Max Daily Amount: 6 Tabs. Indications: causes drowsiness;do not drink,drive, or use machinery while taking; take with a stool softener    metaxalone (SKELAXIN) 800 mg tablet Take 1 Tab by mouth three (3) times daily for 5 days. Indications: may cause drowsiness;do not drink,drive, or use machinery while taking. No current facility-administered medications for this visit. Social History   Substance Use Topics    Smoking status: Never Smoker    Smokeless tobacco: Never Used    Alcohol use 0.0 oz/week     1 - 2 Standard drinks or equivalent per week      Comment: RARE     Visit Vitals    /70 (BP 1 Location: Left arm, BP Patient Position: Sitting)    Pulse 60    Temp 98.1 °F (36.7 °C) (Oral)    Resp 18    Ht 5' 5\" (1.651 m)    Wt 193 lb (87.5 kg)    SpO2 98%    BMI 32.12 kg/m2     Review of Systems   Constitutional: Negative for chills, fever and malaise/fatigue. HENT: Negative for congestion and sore throat.     Respiratory: Positive for cough. Negative for sputum production, shortness of breath and wheezing. Cardiovascular: Positive for chest pain. Negative for palpitations. Gastrointestinal: Negative for abdominal pain. Musculoskeletal: Positive for back pain (left mid back). Neurological: Negative for headaches. All other systems reviewed and are negative. Physical Exam   Constitutional: No distress. HENT:   Right Ear: Tympanic membrane and ear canal normal.   Left Ear: Tympanic membrane and ear canal normal.   Mouth/Throat: Oropharynx is clear and moist.   Neck: Neck supple. Cardiovascular: Normal rate, regular rhythm and normal heart sounds. Pulmonary/Chest: Effort normal and breath sounds normal. She has no wheezes. Abdominal: Soft. She exhibits no distension. There is no tenderness. Musculoskeletal: She exhibits no edema. Mildly TTP left lower thoracoscapular region and right lateral thorax. Negative CVAT. Lymphadenopathy:     She has no cervical adenopathy. Neurological: She is alert. Skin: Skin is warm and dry. Psychiatric: She has a normal mood and affect. ASSESSMENT and PLAN  Wally Soto was seen today for hospital follow up. Diagnoses and all orders for this visit:    Atypical chest pain  -     diclofenac EC (VOLTAREN) 75 mg EC tablet; Take 1 Tab by mouth two (2) times a day. Etiology unclear. Stop norco, naproxen, skelaxin. Trial voltaren as directed. Medication risks, benefits and potential side effects were reviewed. Nyquil cough at bedtime prn. Follow up if sx unresolved in 1 week or worsen.

## 2018-01-16 ENCOUNTER — OFFICE VISIT (OUTPATIENT)
Dept: INTERNAL MEDICINE CLINIC | Age: 33
End: 2018-01-16

## 2018-01-16 VITALS
HEART RATE: 72 BPM | RESPIRATION RATE: 16 BRPM | SYSTOLIC BLOOD PRESSURE: 117 MMHG | OXYGEN SATURATION: 96 % | DIASTOLIC BLOOD PRESSURE: 77 MMHG | BODY MASS INDEX: 30.13 KG/M2 | TEMPERATURE: 98.4 F | HEIGHT: 67 IN | WEIGHT: 192 LBS

## 2018-01-16 DIAGNOSIS — F41.9 ANXIETY AND DEPRESSION: ICD-10-CM

## 2018-01-16 DIAGNOSIS — M54.50 CHRONIC BILATERAL LOW BACK PAIN WITHOUT SCIATICA: ICD-10-CM

## 2018-01-16 DIAGNOSIS — Z23 ENCOUNTER FOR IMMUNIZATION: ICD-10-CM

## 2018-01-16 DIAGNOSIS — E55.9 VITAMIN D DEFICIENCY: ICD-10-CM

## 2018-01-16 DIAGNOSIS — G89.29 CHRONIC BILATERAL LOW BACK PAIN WITHOUT SCIATICA: ICD-10-CM

## 2018-01-16 DIAGNOSIS — F32.A ANXIETY AND DEPRESSION: ICD-10-CM

## 2018-01-16 DIAGNOSIS — Z00.00 PHYSICAL EXAM, ANNUAL: Primary | ICD-10-CM

## 2018-01-16 RX ORDER — CYCLOBENZAPRINE HCL 5 MG
5 TABLET ORAL
Qty: 20 TAB | Refills: 0 | Status: SHIPPED | OUTPATIENT
Start: 2018-01-16 | End: 2018-02-26 | Stop reason: SDUPTHER

## 2018-01-16 RX ORDER — SERTRALINE HYDROCHLORIDE 50 MG/1
50 TABLET, FILM COATED ORAL DAILY
Qty: 30 TAB | Refills: 0 | Status: SHIPPED | OUTPATIENT
Start: 2018-01-16 | End: 2018-03-13 | Stop reason: ALTCHOICE

## 2018-01-16 RX ORDER — SERTRALINE HYDROCHLORIDE 100 MG/1
100 TABLET, FILM COATED ORAL DAILY
Qty: 30 TAB | Refills: 6 | Status: SHIPPED | OUTPATIENT
Start: 2018-01-16 | End: 2018-03-13 | Stop reason: ALTCHOICE

## 2018-01-16 RX ORDER — METHYLPREDNISOLONE 4 MG/1
TABLET ORAL
Qty: 1 DOSE PACK | Refills: 0 | Status: SHIPPED | OUTPATIENT
Start: 2018-01-16 | End: 2018-08-30

## 2018-01-16 NOTE — PROGRESS NOTES
HISTORY OF PRESENT ILLNESS  Lexis Bernstein is a 28 y.o. female. HPI   Pt is here to establish care.     BP is 117/77    Wt is 192 lbs today  Pt states that she has to decrease her portions  Pt drinks [place] qAM and eats drug-rep lunches at work  Advised her not to drink sugary [place] beverages  Advised her to eat drug-rep lunches ~once weekly  Advised her to pack her lunch the night before and eat these home-packed meals at work  Discussed decreasing caloric beverage and increasing water intake      Reviewed labs 2015: LDL and blood counts nl  Will get labs today     Pt previously followed with Dr. Elliott Saunders (family practice) at [facility]  Pt was dx'd with post-partum depression after her last pregnancy and then, depression/anxiety  Pt states that she is irritable and becomes angry with her    Pt was taking zoloft 50 and then, 100mg daily  Pt believes that zoloft 100mg helped with depression/anxiety  Pt states that the medication helped to keep everything stable  Ordered zoloft 50mg daily  If her sx progress, will increase zoloft to 100mg daily     Pt c/o low back pain  Pt was completing upper and lower PT exercises with Dr. Elliott Saunders (family practice)  However, PT did not assist with her low back pain  Pt also discussed having a breast reduction with Dr. Elliott Saunders (family practice)  Pt wonders if w/l assists with improvement to sx  Discussed this being the case  Provided referral for a breast surgeon    Pt c/o intermittent and daily L-sided back pain  Pt experiences these sx in the AM and PM  Pt states her sx worsen throughout the day  Pt is experiencing mild sx in the office today  Pt is R-handed  Pt does not lift too many heavy objects at work  Pt went to the ER for her sx in 2/17  Reviewed notes 2/17: L-sided CP, which went to her upper back, pain worse when sitting up/taking a deep breath/lifting arm, motrin did not help  Reviewed EKG 2/17: nsr  Reviewed CXR 2/17: nl  Reviewed labs : d-dimer and heart markers negative, kidney nl, mild anemia  Per pt, pt was dx'd with pleurisy  Pt was provided with naprosyn, which did not improve her sx  Ordered a medrol dosepack   Ordered flexeril qhs     PMHx:  Heart murmur  H/o   Preeclampsia     FMHx:  Father - diabetes  Mother - HTN  Grandmother - stroke    PSHx:  [place] teeth extraction    Tubal ligation     SHx:  Never smoker  Social drinker  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 Elijah Christopher, , annually  Mammogram: not yet needed  Dexa: not yet needed  Tdap: 18   Pneumovax: not yet needed  Zostavax: not yet needed  Flu shot: 10/15/17  Eye exam: Dr. Veronique Lara,   Lipids: 10/15 LDL 99    Patient Active Problem List    Diagnosis Date Noted    Mixed anxiety and depressive disorder 2016     Current Outpatient Prescriptions   Medication Sig Dispense Refill    diclofenac EC (VOLTAREN) 75 mg EC tablet Take 1 Tab by mouth two (2) times a day. 30 Tab 0    HYDROcodone-acetaminophen (LORTAB 5-325) 5-325 mg per tablet Take 1 Tab by mouth every four (4) hours as needed for Pain (breakthrough pain). Max Daily Amount: 6 Tabs.  Indications: causes drowsiness;do not drink,drive, or use machinery while taking; take with a stool softener 12 Tab 0     Past Surgical History:   Procedure Laterality Date    HX  SECTION      HX TUBAL LIGATION Bilateral 2015    HX WISDOM TEETH EXTRACTION        Lab Results  Component Value Date/Time   WBC 5.4 02/15/2017 05:49 PM   HGB 11.4 02/15/2017 05:49 PM   HCT 32.8 02/15/2017 05:49 PM   PLATELET 123  05:49 PM   MCV 78.7 02/15/2017 05:49 PM     Lab Results  Component Value Date/Time   Cholesterol, total 183 10/07/2015 05:29 PM   HDL Cholesterol 72 10/07/2015 05:29 PM   LDL, calculated 99 10/07/2015 05:29 PM   Triglyceride 60 10/07/2015 05:29 PM     Lab Results  Component Value Date/Time   GFR est non-AA >60 02/15/2017 05:49 PM   GFR est AA >60 02/15/2017 05:49 PM   Creatinine 0.70 02/15/2017 05:49 PM   BUN 11 02/15/2017 05:49 PM   Sodium 141 02/15/2017 05:49 PM   Potassium 3.8 02/15/2017 05:49 PM   Chloride 105 02/15/2017 05:49 PM   CO2 30 02/15/2017 05:49 PM        Review of Systems   Constitutional: Negative for chills and fever. HENT: Negative for hearing loss and tinnitus. Eyes: Negative for blurred vision and double vision. Respiratory: Negative for shortness of breath. Cardiovascular: Negative for chest pain and palpitations. Gastrointestinal: Negative for nausea and vomiting. Genitourinary: Negative for dysuria and frequency. Musculoskeletal: Positive for back pain. Negative for falls. Skin: Negative for itching and rash. Neurological: Negative for dizziness, loss of consciousness and headaches. Psychiatric/Behavioral: Positive for depression. The patient is nervous/anxious. Physical Exam   Constitutional: She is oriented to person, place, and time. She appears well-developed and well-nourished. No distress. HENT:   Head: Normocephalic and atraumatic. Right Ear: External ear normal.   Left Ear: External ear normal.   Mouth/Throat: Oropharynx is clear and moist. No oropharyngeal exudate. Eyes: Conjunctivae and EOM are normal. Pupils are equal, round, and reactive to light. Right eye exhibits no discharge. Left eye exhibits no discharge. No scleral icterus. Neck: Normal range of motion. Neck supple. No carotid bruits    Cardiovascular: Normal rate, regular rhythm, normal heart sounds and intact distal pulses. Exam reveals no gallop and no friction rub. No murmur heard. Pulmonary/Chest: Effort normal and breath sounds normal. No respiratory distress. She has no wheezes. She has no rales. She exhibits no tenderness. Abdominal: Soft. She exhibits no distension and no mass. There is no tenderness. There is no rebound and no guarding.    Musculoskeletal: Normal range of motion. She exhibits no edema, tenderness or deformity. Lymphadenopathy:     She has no cervical adenopathy. Neurological: She is alert and oriented to person, place, and time. Coordination normal.   Skin: Skin is warm and dry. No rash noted. She is not diaphoretic. No erythema. No pallor. Psychiatric: She has a normal mood and affect. Her behavior is normal.       ASSESSMENT and PLAN    ICD-10-CM ICD-9-CM    1. Physical exam, annual    Discussed diet and weight loss, decreasing prebought food for lunch, Tdap today, flu shot UTD, labs ordered, pelvic UTD, eye exam in Spring, COLO and mammo not yet needed   Z00.00 V70.0    2. Anxiety and depression    Restart zoloft at 50mg, increase to 100mg after 1 month   F41.8 300.00      311    3. Chronic bilateral low back pain without sciatica    Will give medrol dosepack and flexeril, this is a long-standing problem, likely related to large breasts, has tried PT which has not improved her sx, will have her see plastic surg for further eval   M54.5 724.2 REFERRAL TO PLASTIC SURGERY    G89.29 338.29         Scribed by [address], as dictated by Dr. Eugene Benjamin. Current diagnosis and concerns discussed with pt at length. Pt understands risks and benefits or current treatment plan and medications, and accepts the treatment and medication with any possible risks. Pt asks appropriate questions, which were answered. Pt was instructed to call with any concerns or problems. This note will not be viewable in [address]

## 2018-01-17 LAB
25(OH)D3+25(OH)D2 SERPL-MCNC: 12.2 NG/ML (ref 30–100)
ALBUMIN SERPL-MCNC: 4.6 G/DL (ref 3.5–5.5)
ALBUMIN/GLOB SERPL: 2 {RATIO} (ref 1.2–2.2)
ALP SERPL-CCNC: 46 IU/L (ref 39–117)
ALT SERPL-CCNC: 13 IU/L (ref 0–32)
AST SERPL-CCNC: 13 IU/L (ref 0–40)
BILIRUB SERPL-MCNC: 0.3 MG/DL (ref 0–1.2)
BUN SERPL-MCNC: 10 MG/DL (ref 6–20)
BUN/CREAT SERPL: 13 (ref 9–23)
CALCIUM SERPL-MCNC: 9.3 MG/DL (ref 8.7–10.2)
CHLORIDE SERPL-SCNC: 104 MMOL/L (ref 96–106)
CHOLEST SERPL-MCNC: 159 MG/DL (ref 100–199)
CO2 SERPL-SCNC: 21 MMOL/L (ref 18–29)
CREAT SERPL-MCNC: 0.76 MG/DL (ref 0.57–1)
ERYTHROCYTE [DISTWIDTH] IN BLOOD BY AUTOMATED COUNT: 14.4 % (ref 12.3–15.4)
EST. AVERAGE GLUCOSE BLD GHB EST-MCNC: 103 MG/DL
GLOBULIN SER CALC-MCNC: 2.3 G/DL (ref 1.5–4.5)
GLUCOSE SERPL-MCNC: 81 MG/DL (ref 65–99)
HBA1C MFR BLD: 5.2 % (ref 4.8–5.6)
HCT VFR BLD AUTO: 34.7 % (ref 34–46.6)
HDLC SERPL-MCNC: 57 MG/DL
HGB BLD-MCNC: 11.4 G/DL (ref 11.1–15.9)
LDLC SERPL CALC-MCNC: 91 MG/DL (ref 0–99)
MCH RBC QN AUTO: 26.7 PG (ref 26.6–33)
MCHC RBC AUTO-ENTMCNC: 32.9 G/DL (ref 31.5–35.7)
MCV RBC AUTO: 81 FL (ref 79–97)
PLATELET # BLD AUTO: 270 X10E3/UL (ref 150–379)
POTASSIUM SERPL-SCNC: 4.2 MMOL/L (ref 3.5–5.2)
PROT SERPL-MCNC: 6.9 G/DL (ref 6–8.5)
RBC # BLD AUTO: 4.27 X10E6/UL (ref 3.77–5.28)
SODIUM SERPL-SCNC: 144 MMOL/L (ref 134–144)
TRIGL SERPL-MCNC: 55 MG/DL (ref 0–149)
TSH SERPL DL<=0.005 MIU/L-ACNC: 1.4 UIU/ML (ref 0.45–4.5)
VLDLC SERPL CALC-MCNC: 11 MG/DL (ref 5–40)
WBC # BLD AUTO: 6.2 X10E3/UL (ref 3.4–10.8)

## 2018-01-17 RX ORDER — ERGOCALCIFEROL 1.25 MG/1
50000 CAPSULE ORAL
Qty: 8 CAP | Refills: 0 | Status: SHIPPED | OUTPATIENT
Start: 2018-01-17 | End: 2018-03-13 | Stop reason: SDUPTHER

## 2018-01-17 NOTE — PROGRESS NOTES
vit D is low--the patient needs 50,000 units weekly for 8 weeks then start a daily 2000unit supplement instead.     Rest labs fine

## 2018-01-17 NOTE — PROGRESS NOTES
Called, spoke to pt. Two pt identifiers confirmed. Pt informed per Dr. Gabby Doll vit D is low--the patient needs 50,000 units weekly for 8 weeks then start a daily 2000unit supplement instead-pt agreed and ordered. Pt informed per Dr. Gabby Doll rest of labs are fine. Pt verbalized understanding of information discussed w/ no further questions at this time.

## 2018-02-26 NOTE — TELEPHONE ENCOUNTER
From: Mary Perez  To: Kylie Dang MD  Sent: 2/26/2018 2:58 PM EST  Subject: Medication Renewal Request    Original authorizing provider: Kylie Dang MD    [facility] would like a refill of the following medications:  cyclobenzaprine (FLEXERIL) 5 mg tablet Kylie Dang MD]    [facility]    Comment: This medication seems to have been working for me I just wanted to know if I could get a refill on this.  Thank you Have a good day

## 2018-02-27 ENCOUNTER — DOCUMENTATION ONLY (OUTPATIENT)
Dept: INTERNAL MEDICINE CLINIC | Age: 33
End: 2018-02-27

## 2018-02-27 RX ORDER — CYCLOBENZAPRINE HCL 5 MG
5 TABLET ORAL
Qty: 20 TAB | Refills: 0 | Status: SHIPPED | OUTPATIENT
Start: 2018-02-27 | End: 2018-09-04

## 2018-02-27 NOTE — PROGRESS NOTES
Following rx was faxed to pharmacy with confirmation received:      cyclobenzaprine (FLEXERIL) 5 mg tablet [[id number]]   Order Details   Dose: 5 mg Route: Oral Frequency: EVERY BEDTIME   Dispense Quantity:  20 Tab Refills:  0 Fills Remaining:  --           Sig: Take 1 Tab by mouth nightly.          Written Date:  02/27/18 Expiration Date:  --     Start Date:  02/27/18 End Date:  --            Ordering Provider:  -- LYDIA #:  -- NPI:  --    Authorizing Provider:  Glen Arrington MD LYDIA #:  [id number] NPI:  [id number]    Ordering User:  Glen Arrington MD

## 2018-03-13 ENCOUNTER — OFFICE VISIT (OUTPATIENT)
Dept: INTERNAL MEDICINE CLINIC | Age: 33
End: 2018-03-13

## 2018-03-13 VITALS
OXYGEN SATURATION: 97 % | WEIGHT: 195 LBS | BODY MASS INDEX: 30.61 KG/M2 | TEMPERATURE: 97.9 F | DIASTOLIC BLOOD PRESSURE: 78 MMHG | HEIGHT: 67 IN | HEART RATE: 98 BPM | RESPIRATION RATE: 16 BRPM | SYSTOLIC BLOOD PRESSURE: 119 MMHG

## 2018-03-13 DIAGNOSIS — F32.A ANXIETY AND DEPRESSION: Primary | ICD-10-CM

## 2018-03-13 DIAGNOSIS — E55.9 VITAMIN D DEFICIENCY: ICD-10-CM

## 2018-03-13 DIAGNOSIS — F41.9 ANXIETY AND DEPRESSION: Primary | ICD-10-CM

## 2018-03-13 DIAGNOSIS — E66.9 OBESITY (BMI 30.0-34.9): ICD-10-CM

## 2018-03-13 RX ORDER — ERGOCALCIFEROL 1.25 MG/1
50000 CAPSULE ORAL
Qty: 8 CAP | Refills: 0 | Status: SHIPPED | OUTPATIENT
Start: 2018-03-13 | End: 2018-09-04

## 2018-03-13 RX ORDER — BUPROPION HYDROCHLORIDE 150 MG/1
150 TABLET ORAL
Qty: 30 TAB | Refills: 2 | Status: SHIPPED | OUTPATIENT
Start: 2018-03-13 | End: 2018-09-04

## 2018-03-13 NOTE — PROGRESS NOTES
HISTORY OF PRESENT ILLNESS  Wing Brown is a 28 y.o. female. HPI   Last here 1/16/18. Pt is here for routine care. BP is 119/78     Wt is up 3 lbs x lov   Will add wellbutrin to assist with w/l  Discussed diet and weight loss      Reviewed labs 1/18: low vit D    Over the phone, I asked her to take vit D 50K units once weekly and then daily  However, pt did not take this vit  Will have her take once weekly and then daily vit D    Lov, I restarted her on zoloft 100mg daily for depression/anxiety  Pt states that this medication improved her mood  However, pt believed that this medication caused decreased libido   Pt stopped this medication ~2 weeks ago  Pt would like to try an alternate medication   Discussed SE of wellbutrin to include w/l  Will start wellbutrin 150mg daily  Recall zoloft caused decreased libido     Lov, pt c/o low back pain d/t breast size  Lov, I provided her with a referral for a breast surgeon  However, she has not seen this surgeon as of yet    Lov, pt c/o intermittent and daily L-sided back pain  Lov, I provided her with a medrol dosepack and flexeril qhs      PREVENTIVE:  Colonoscopy:  Pap: Dr. Carrington November, 4/17, annually, due 4/18  Mammogram: not yet needed  Dexa: not yet needed  Tdap: 01/16/18   Pneumovax: not yet needed  Zostavax: not yet needed  Flu shot: 10/15/17  Eye exam: Dr. Edwige Wolf, 8/17  Lipids: 1/18 LDL 91    Patient Active Problem List    Diagnosis Date Noted    Anxiety and depression 01/16/2018    Mixed anxiety and depressive disorder 04/04/2016     Current Outpatient Prescriptions   Medication Sig Dispense Refill    cyclobenzaprine (FLEXERIL) 5 mg tablet Take 1 Tab by mouth nightly. 20 Tab 0    sertraline (ZOLOFT) 50 mg tablet Take 1 Tab by mouth daily. 30 Tab 0    ergocalciferol (ERGOCALCIFEROL) 50,000 unit capsule Take 1 Cap by mouth every seven (7) days. 8 Cap 0    sertraline (ZOLOFT) 100 mg tablet Take 1 Tab by mouth daily.  30 Tab 6    methylPREDNISolone (MEDROL DOSEPACK) 4 mg tablet Per pack 1 Dose Pack 0     Past Surgical History:   Procedure Laterality Date    HX  SECTION      HX TUBAL LIGATION Bilateral 2015    HX WISDOM TEETH EXTRACTION        Lab Results  Component Value Date/Time   WBC 6.2 2018 10:24 AM   HGB 11.4 2018 10:24 AM   HCT 34.7 2018 10:24 AM   PLATELET 843  10:24 AM   MCV 81 2018 10:24 AM     Lab Results  Component Value Date/Time   Cholesterol, total 159 2018 10:24 AM   HDL Cholesterol 57 2018 10:24 AM   LDL, calculated 91 2018 10:24 AM   Triglyceride 55 2018 10:24 AM     Lab Results  Component Value Date/Time   GFR est non- 2018 10:24 AM   GFR est  2018 10:24 AM   Creatinine 0.76 2018 10:24 AM   BUN 10 2018 10:24 AM   Sodium 144 2018 10:24 AM   Potassium 4.2 2018 10:24 AM   Chloride 104 2018 10:24 AM   CO2 21 2018 10:24 AM        Review of Systems   Respiratory: Negative for shortness of breath. Cardiovascular: Negative for chest pain. Psychiatric/Behavioral: Negative for depression. The patient is not nervous/anxious. Physical Exam   Constitutional: She is oriented to person, place, and time. She appears well-developed and well-nourished. No distress. HENT:   Head: Normocephalic and atraumatic. Mouth/Throat: Oropharynx is clear and moist. No oropharyngeal exudate. Eyes: Conjunctivae and EOM are normal. Right eye exhibits no discharge. Left eye exhibits no discharge. Neck: Normal range of motion. Neck supple. Cardiovascular: Normal rate, regular rhythm and normal heart sounds. Exam reveals no gallop and no friction rub. No murmur heard. Pulmonary/Chest: Effort normal and breath sounds normal. No respiratory distress. She has no wheezes. She has no rales. She exhibits no tenderness. Musculoskeletal: Normal range of motion. She exhibits no edema, tenderness or deformity. Lymphadenopathy:     She has no cervical adenopathy. Neurological: She is alert and oriented to person, place, and time. Coordination normal.   Skin: Skin is warm and dry. No rash noted. She is not diaphoretic. No erythema. No pallor. Psychiatric: She has a normal mood and affect. Her behavior is normal.       ASSESSMENT and PLAN    ICD-10-CM ICD-9-CM    1. Anxiety and depression    Will start wellbutrin 150mg daily, stay off zoloft d/t decreased libido on this med   F41.8 300.00      311    2. Vitamin D deficiency    Star 50K units weekly OTC and then start 2000U/day once this is complete   E55.9 268.9 ergocalciferol (ERGOCALCIFEROL) 50,000 unit capsule   3. Obesity (BMI 30.0-34. 9)    Discussed diet and weight loss, wellbutrin will assist with this   E66.9 278.00         Scribed by [address], as dictated by Dr. Petra Roper. Current diagnosis and concerns discussed with pt at length. Pt understands risks and benefits or current treatment plan and medications, and accepts the treatment and medication with any possible risks. Pt asks appropriate questions, which were answered. Pt was instructed to call with any concerns or problems. This note will not be viewable in [address]

## 2018-03-13 NOTE — MR AVS SNAPSHOT
[address] [place] [address] 
[phone] Patient: Mindy Ham MRN: [id number] HWS:[birth year] Visit Information Date & Time Provider Department Dept. Phone Encounter #  
 3/13/2018  3:00 PM Margaret Eaton, [address]  [id number] Follow-up Instructions Return in about 6 weeks (around 4/24/2018), or if symptoms worsen or fail to improve. Upcoming Health Maintenance Date Due  
 PAP AKA CERVICAL CYTOLOGY 4/10/2020 DTaP/Tdap/Td series (2 - Td) 1/16/2028 Allergies as of 3/13/2018  Review Complete On: 3/13/2018 By: Margaret Eaton MD  
 No Known Allergies Current Immunizations  Reviewed on 1/16/2018 Name Date Influenza Vaccine 10/15/2017 Tdap 1/16/2018 Not reviewed this visit You Were Diagnosed With   
  
 Codes Comments Anxiety and depression    -  Primary ICD-10-CM: F41.8 ICD-9-CM: 300.00, 311 Vitamin D deficiency     ICD-10-CM: E55.9 ICD-9-CM: 268.9 Obesity (BMI 30.0-34.9)     ICD-10-CM: O21.3 ICD-9-CM: 278.00 Vitals BP Pulse Temp Resp Height(growth percentile) Weight(growth percentile) 119/78 (BP 1 Location: Left arm, BP Patient Position: Sitting) 98 97.9 °F (36.6 °C) (Oral) 16 5' 6.5\" (1.689 m) 195 lb (88.5 kg) SpO2 BMI OB Status Smoking Status 97% 31 kg/m2 Having regular periods Never Smoker Vitals History BMI and BSA Data Body Mass Index Body Surface Area  
 31 kg/m 2 2.04 m 2 Preferred Pharmacy Pharmacy Name Phone [facility] [id number] Tierney Mónica, [address] [phone] Your Updated Medication List  
  
   
This list is accurate as of 3/13/18  3:20 PM.  Always use your most recent med list.  
  
  
  
  
 buPROPion  mg tablet Commonly known as:  Tod Mutters Take 1 Tab by mouth every morning. cyclobenzaprine 5 mg tablet Commonly known as:  FLEXERIL Take 1 Tab by mouth nightly.  
  
 ergocalciferol 50,000 unit capsule Commonly known as:  ERGOCALCIFEROL Take 1 Cap by mouth every seven (7) days. methylPREDNISolone 4 mg tablet Commonly known as:  Noah Elena Per pack Prescriptions Sent to Pharmacy Refills buPROPion XL (WELLBUTRIN XL) 150 mg tablet 2 Sig: Take 1 Tab by mouth every morning. Class: Normal  
 Pharmacy: [address] Ph #: [phone] Route: Oral  
 ergocalciferol (ERGOCALCIFEROL) 50,000 unit capsule 0 Sig: Take 1 Cap by mouth every seven (7) days. Class: Normal  
 Pharmacy: [address] Ph #: [phone] Route: Oral  
  
Follow-up Instructions Return in about 6 weeks (around 4/24/2018), or if symptoms worsen or fail to improve. Patient Instructions Take vitamin d weekly for 8 weeks then 2000units per day  
 
wellbutrin for anxiety Shane Castellanos Introducing [facility] & [facility] SERVICES! Dear Dave Vigil: Thank you for requesting a [employer] account. Our records indicate that you already have an active [employer] account. You can access your account anytime at [email or web address] Did you know that you can access your hospital and ER discharge instructions at any time in [employer]? You can also review all of your test results from your hospital stay or ER visit. Additional Information If you have questions, please visit the Frequently Asked Questions section of the [employer] website at [email or web address] Remember, [employer] is NOT to be used for urgent needs. For medical emergencies, dial 911. Now available from your iPhone and Android! Please provide this summary of care documentation to your next provider. Your primary care clinician is listed as Herve Jiménez.  If you have any questions after today's visit, please call [phone].

## 2018-03-14 ENCOUNTER — TELEPHONE (OUTPATIENT)
Dept: INTERNAL MEDICINE CLINIC | Age: 33
End: 2018-03-14

## 2018-03-14 ENCOUNTER — OFFICE VISIT (OUTPATIENT)
Dept: INTERNAL MEDICINE CLINIC | Age: 33
End: 2018-03-14

## 2018-03-14 VITALS
SYSTOLIC BLOOD PRESSURE: 110 MMHG | HEIGHT: 67 IN | TEMPERATURE: 98.1 F | HEART RATE: 81 BPM | BODY MASS INDEX: 30.61 KG/M2 | DIASTOLIC BLOOD PRESSURE: 73 MMHG | RESPIRATION RATE: 16 BRPM | OXYGEN SATURATION: 95 % | WEIGHT: 195 LBS

## 2018-03-14 DIAGNOSIS — M54.6 ACUTE BILATERAL THORACIC BACK PAIN: Primary | ICD-10-CM

## 2018-03-14 DIAGNOSIS — M54.50 CHRONIC BILATERAL LOW BACK PAIN WITHOUT SCIATICA: ICD-10-CM

## 2018-03-14 DIAGNOSIS — M54.6 PAIN IN THORACIC SPINE: Primary | ICD-10-CM

## 2018-03-14 DIAGNOSIS — G89.29 CHRONIC BILATERAL LOW BACK PAIN WITHOUT SCIATICA: ICD-10-CM

## 2018-03-14 RX ORDER — CYCLOBENZAPRINE HCL 10 MG
10 TABLET ORAL
Qty: 20 TAB | Refills: 0 | Status: SHIPPED | OUTPATIENT
Start: 2018-03-14 | End: 2018-05-15 | Stop reason: SDUPTHER

## 2018-03-14 NOTE — LETTER
NOTIFICATION RETURN TO WORK / SCHOOL 
 
3/14/2018 1:59 PM 
 
Ms. Shonda Chapman [facility] [phone] To Whom It May Concern: 
 
Shonda Chapman is currently under the care of [facility]. She will return to work/school on: 3/15/18 If there are questions or concerns please have the patient contact our office.  
 
 
 
Sincerely, 
 
 
Herve Jiménez MD

## 2018-03-14 NOTE — MR AVS SNAPSHOT
[address] [facility] 
[phone] Patient: Jaelyn Plunkett MRN: [id number] MHX:[birth year] Visit Information Date & Time Provider Department Dept. Phone Encounter #  
 3/14/2018  1:45 PM Jaye Quiroz, [address] [phone] [id number] Your Appointments 4/24/2018  2:30 PM  
ROUTINE CARE with Jaye Quiroz, [address] [facility]) Appt Note: 6 week f/u  
 [address] [id number]  
[address] [facility] Upcoming Health Maintenance Date Due  
 PAP AKA CERVICAL CYTOLOGY 4/10/2020 DTaP/Tdap/Td series (2 - Td) 1/16/2028 Allergies as of 3/14/2018  Review Complete On: 3/14/2018 By: Singh Diaz LPN No Known Allergies Current Immunizations  Reviewed on 1/16/2018 Name Date Influenza Vaccine 10/15/2017 Tdap 1/16/2018 Not reviewed this visit You Were Diagnosed With   
  
 Codes Comments Acute bilateral thoracic back pain    -  Primary ICD-10-CM: M54.6 ICD-9-CM: 724.1 Chronic bilateral low back pain without sciatica     ICD-10-CM: M54.5, G89.29 ICD-9-CM: 724.2, 338.29 Vitals BP Pulse Temp Resp Height(growth percentile) Weight(growth percentile) 110/73 (BP 1 Location: Left arm, BP Patient Position: Sitting) 81 98.1 °F (36.7 °C) (Oral) 16 5' 6.5\" (1.689 m) 195 lb (88.5 kg) SpO2 BMI OB Status Smoking Status 95% 31 kg/m2 Having regular periods Never Smoker Vitals History BMI and BSA Data Body Mass Index Body Surface Area  
 31 kg/m 2 2.04 m 2 Preferred Pharmacy Pharmacy Name Phone [facility] [id number] [place], [address] [place] [address] [address] [phone] Your Updated Medication List  
  
   
This list is accurate as of 3/14/18  1:58 PM.  Always use your most recent med list.  
  
  
  
  
 buPROPion  mg tablet Commonly known as:  Gloster Hence Take 1 Tab by mouth every morning. * cyclobenzaprine 5 mg tablet Commonly known as:  FLEXERIL Take 1 Tab by mouth nightly. * cyclobenzaprine 10 mg tablet Commonly known as:  FLEXERIL Take 1 Tab by mouth three (3) times daily as needed for Muscle Spasm(s). ergocalciferol 50,000 unit capsule Commonly known as:  ERGOCALCIFEROL Take 1 Cap by mouth every seven (7) days. methylPREDNISolone 4 mg tablet Commonly known as:  Lucila Has Per pack * Notice: This list has 2 medication(s) that are the same as other medications prescribed for you. Read the directions carefully, and ask your doctor or other care provider to review them with you. Prescriptions Sent to Pharmacy Refills  
 cyclobenzaprine (FLEXERIL) 10 mg tablet 0 Sig: Take 1 Tab by mouth three (3) times daily as needed for Muscle Spasm(s). Class: Normal  
 Pharmacy: [address] Ph #: [phone] Route: Oral  
  
To-Do List   
 03/14/2018 Imaging:  XR SPINE LUMB 2 OR 3 V   
  
 03/14/2018 Imaging:  XR SPINE THORAC 3 V Introducing [facility] & [facility] SERVICES! Dear Vargas Bartholomew: Thank you for requesting a [employer] account. Our records indicate that you already have an active [employer] account. You can access your account anytime at [email or web address] Dash Did you know that you can access your hospital and ER discharge instructions at any time in [employer]Trax? You can also review all of your test results from your hospital stay or ER visit. Additional Information If you have questions, please visit the Frequently Asked Questions section of the [employer] website at [email or web address] Remember, [employer] is NOT to be used for urgent needs. For medical emergencies, dial 911. Now available from your iPhone and Android! Please provide this summary of care documentation to your next provider. Your primary care clinician is listed as Mindi Fonseca. If you have any questions after today's visit, please call [phone].

## 2018-03-14 NOTE — PROGRESS NOTES
HISTORY OF PRESENT ILLNESS  Yamilet Kenney is a 28 y.o. female. HPI   Last here 3/13/18. Pt is here for acute care. Pt states that her garage door is broken   Pt heard her garage door \"click\" and then, became trapped under this door x this AM  Pt states that this door hit her lower back, causing lower back pain  Pt denies having pain radiating to her BLE or BLUE  Pt called her  who helped her from under this door  Pt took advil 800mg today  Ordered flexeril 10mg   Ordered XR L spine  Ordered XR T spine  Discussed her sx worsening in the next 1-2 days  Advised her to apply a warm compress to her back  Advised her not to complete strenuous exercises  Provided her with a work note (go back to work tomorrow)    Patient Active Problem List    Diagnosis Date Noted    Anxiety and depression 2018    Mixed anxiety and depressive disorder 2016     Current Outpatient Prescriptions   Medication Sig Dispense Refill    buPROPion XL (WELLBUTRIN XL) 150 mg tablet Take 1 Tab by mouth every morning. 30 Tab 2    ergocalciferol (ERGOCALCIFEROL) 50,000 unit capsule Take 1 Cap by mouth every seven (7) days. 8 Cap 0    cyclobenzaprine (FLEXERIL) 5 mg tablet Take 1 Tab by mouth nightly.  20 Tab 0    methylPREDNISolone (MEDROL DOSEPACK) 4 mg tablet Per pack 1 Dose Pack 0     Past Surgical History:   Procedure Laterality Date    HX  SECTION      HX TUBAL LIGATION Bilateral 2015    HX WISDOM TEETH EXTRACTION        Lab Results  Component Value Date/Time   WBC 6.2 2018 10:24 AM   HGB 11.4 2018 10:24 AM   HCT 34.7 2018 10:24 AM   PLATELET 868  10:24 AM   MCV 81 2018 10:24 AM     Lab Results  Component Value Date/Time   Cholesterol, total 159 2018 10:24 AM   HDL Cholesterol 57 2018 10:24 AM   LDL, calculated 91 2018 10:24 AM   Triglyceride 55 2018 10:24 AM     Lab Results  Component Value Date/Time   GFR est non- 2018 10:24 AM GFR est  01/16/2018 10:24 AM   Creatinine 0.76 01/16/2018 10:24 AM   BUN 10 01/16/2018 10:24 AM   Sodium 144 01/16/2018 10:24 AM   Potassium 4.2 01/16/2018 10:24 AM   Chloride 104 01/16/2018 10:24 AM   CO2 21 01/16/2018 10:24 AM        Review of Systems   Respiratory: Negative for shortness of breath. Cardiovascular: Negative for chest pain. Musculoskeletal: Positive for back pain. Physical Exam   Constitutional: She is oriented to person, place, and time. She appears well-developed and well-nourished. No distress. HENT:   Head: Normocephalic and atraumatic. Mouth/Throat: Oropharynx is clear and moist. No oropharyngeal exudate. Eyes: Conjunctivae and EOM are normal. Right eye exhibits no discharge. Left eye exhibits no discharge. Neck: Normal range of motion. Neck supple. Cardiovascular: Normal rate, regular rhythm and normal heart sounds. Exam reveals no gallop and no friction rub. No murmur heard. Pulmonary/Chest: Effort normal and breath sounds normal. No respiratory distress. She has no wheezes. She has no rales. She exhibits no tenderness. Musculoskeletal: Normal range of motion. She exhibits no edema, tenderness or deformity. Lymphadenopathy:     She has no cervical adenopathy. Neurological: She is alert and oriented to person, place, and time. Coordination normal.   Skin: Skin is warm and dry. No rash noted. She is not diaphoretic. No erythema. No pallor. Psychiatric: She has a normal mood and affect. Her behavior is normal.       ASSESSMENT and PLAN    ICD-10-CM ICD-9-CM    1. Acute bilateral thoracic back pain    Will check plain film, pt was injured by a garage door earlier today, motrin and flexeril prn, will give work to go back to work tomorrow, discussed heating pad and no heavy lifting/pushing, check plain film   M54.6 724.1 XR SPINE LUMB 2 OR 3 V      XR SPINE THORAC 3 V   2.  Chronic bilateral low back pain without sciatica    See above   M54.5 724.2 XR SPINE LUMB 2 OR 3 V    G89.29 338.29         Scribed by [address], as dictated by Dr. Madai Vargas. Current diagnosis and concerns discussed with pt at length. Pt understands risks and benefits or current treatment plan and medications, and accepts the treatment and medication with any possible risks. Pt asks appropriate questions, which were answered. Pt was instructed to call with any concerns or problems. This note will not be viewable in [address]

## 2018-03-14 NOTE — TELEPHONE ENCOUNTER
Spoke with patient. Two pt identifiers confirmed. Patient states that her garage door fell on her this morning. Per Dr. Leah Stone, patient needs to be seen before xrays can be ordered. Patient advised to come to the office this afternoon to be seen, Dr. Leah Stone working her in. Pt verbalized understanding of information discussed w/ no further questions at this time. Patient states that she will come now to be seen.

## 2018-03-14 NOTE — TELEPHONE ENCOUNTER
#[phone]  Pt states flaco jorge fell on her this morning. Her back is in pain. Pt is requesting order for x ray of her back. 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 Please call with any questions or to let her know order is in the system.

## 2018-05-16 NOTE — TELEPHONE ENCOUNTER
From: Demetri Combs  To: Elizabeth Garzon MD  Sent: 5/15/2018 1:27 PM EDT  Subject: Medication Renewal Request    Original authorizing provider: MD Jewell Hawthorne would like a refill of the following medications:  cyclobenzaprine (FLEXERIL) 10 mg tablet Elizabeth Garzon MD]    Preferred pharmacy: [address]    Comment:

## 2018-05-16 NOTE — TELEPHONE ENCOUNTER
PCP: Kizzy Loza MD    Last appt: 3/14/2018  No future appointments. Requested Prescriptions     Pending Prescriptions Disp Refills    cyclobenzaprine (FLEXERIL) 10 mg tablet 20 Tab 0     Sig: Take 1 Tab by mouth three (3) times daily as needed for Muscle Spasm(s).

## 2018-05-17 RX ORDER — CYCLOBENZAPRINE HCL 10 MG
10 TABLET ORAL
Qty: 20 TAB | Refills: 0 | Status: SHIPPED | OUTPATIENT
Start: 2018-05-17 | End: 2018-07-27 | Stop reason: SDUPTHER

## 2018-08-30 ENCOUNTER — OFFICE VISIT (OUTPATIENT)
Dept: URGENT CARE | Age: 33
End: 2018-08-30

## 2018-08-30 VITALS
SYSTOLIC BLOOD PRESSURE: 134 MMHG | HEIGHT: 66 IN | BODY MASS INDEX: 31.82 KG/M2 | OXYGEN SATURATION: 98 % | TEMPERATURE: 97.2 F | WEIGHT: 198 LBS | DIASTOLIC BLOOD PRESSURE: 74 MMHG | RESPIRATION RATE: 18 BRPM | HEART RATE: 73 BPM

## 2018-08-30 DIAGNOSIS — J04.0 LARYNGITIS: ICD-10-CM

## 2018-08-30 DIAGNOSIS — J20.9 ACUTE BRONCHITIS, UNSPECIFIED ORGANISM: Primary | ICD-10-CM

## 2018-08-30 DIAGNOSIS — J01.00 ACUTE NON-RECURRENT MAXILLARY SINUSITIS: ICD-10-CM

## 2018-08-30 RX ORDER — AMOXICILLIN AND CLAVULANATE POTASSIUM 875; 125 MG/1; MG/1
1 TABLET, FILM COATED ORAL 2 TIMES DAILY
Qty: 20 TAB | Refills: 0 | Status: SHIPPED | OUTPATIENT
Start: 2018-08-30 | End: 2018-09-04

## 2018-08-30 RX ORDER — BENZONATATE 100 MG/1
100 CAPSULE ORAL
Qty: 30 CAP | Refills: 0 | Status: SHIPPED | OUTPATIENT
Start: 2018-08-30 | End: 2018-09-04

## 2018-08-30 RX ORDER — PREDNISONE 5 MG/1
TABLET ORAL
Qty: 21 TAB | Refills: 0 | Status: SHIPPED | OUTPATIENT
Start: 2018-08-30 | End: 2018-09-04

## 2018-08-30 NOTE — MR AVS SNAPSHOT
[facility] [address] [place] [address] 
[phone] Patient: Camden Castillo MRN: [id number]:[id number] Visit Information Date & Time Provider Department Dept. Phone Encounter #  
 8/30/2018 11:30 AM [facility] [phone] [id number] Your Appointments 9/4/2018 11:45 AM  
ROUTINE CARE with Ryley Hirsch, [address] [place] [address]) Appt Note: 6mo f/u  
 [address] [id number]  
[address] [place] [address] [address] [place] [address] Upcoming Health Maintenance Date Due Influenza Age 5 to Adult 8/1/2018 PAP AKA CERVICAL CYTOLOGY 4/10/2020 DTaP/Tdap/Td series (2 - Td) 1/16/2028 Allergies as of 8/30/2018  Review Complete On: 8/30/2018 By: Jessie Vick,  No Known Allergies Current Immunizations  Reviewed on 1/16/2018 Name Date Influenza Vaccine 10/15/2017 Tdap 1/16/2018 Not reviewed this visit You Were Diagnosed With   
  
 Codes Comments Acute bronchitis, unspecified organism    -  Primary ICD-10-CM: J20.9 ICD-9-CM: 466.0 Acute non-recurrent maxillary sinusitis     ICD-10-CM: J01.00 ICD-9-CM: 461.0 Laryngitis     ICD-10-CM: J04.0 ICD-9-CM: 464.00 Vitals BP Pulse Temp Resp Height(growth percentile) Weight(growth percentile) 134/74 73 97.2 °F (36.2 °C) 18 5' 6\" (1.676 m) 198 lb (89.8 kg) LMP SpO2 BMI OB Status Smoking Status 08/20/2018 98% 31.96 kg/m2 Having regular periods Never Smoker Vitals History BMI and BSA Data Body Mass Index Body Surface Area 31.96 kg/m 2 2.04 m 2 Preferred Pharmacy Pharmacy Name Phone [facility] [address], [address] [phone] Your Updated Medication List  
  
   
 This list is accurate as of 8/30/18 12:36 PM.  Always use your most recent med list.  
  
  
  
  
 amoxicillin-clavulanate 875-125 mg per tablet Commonly known as:  AUGMENTIN Take 1 Tab by mouth two (2) times a day for 10 days. benzonatate 100 mg capsule Commonly known as:  TESSALON PERLES Take 1 Cap by mouth three (3) times daily as needed for Cough for up to 7 days. buPROPion  mg tablet Commonly known as:  Alejandra Sizer Take 1 Tab by mouth every morning. * cyclobenzaprine 5 mg tablet Commonly known as:  FLEXERIL Take 1 Tab by mouth nightly. * cyclobenzaprine 10 mg tablet Commonly known as:  FLEXERIL Take 1 Tab by mouth three (3) times daily as needed for Muscle Spasm(s). ergocalciferol 50,000 unit capsule Commonly known as:  ERGOCALCIFEROL Take 1 Cap by mouth every seven (7) days. predniSONE 5 mg dose pack Commonly known as:  STERAPRED See administration instruction per 5mg dose pack * Notice: This list has 2 medication(s) that are the same as other medications prescribed for you. Read the directions carefully, and ask your doctor or other care provider to review them with you. Prescriptions Sent to Pharmacy Refills  
 amoxicillin-clavulanate (AUGMENTIN) 875-125 mg per tablet 0 Sig: Take 1 Tab by mouth two (2) times a day for 10 days. Class: Normal  
 Pharmacy: [facility] [address] [place] [address] Ph #: [phone] Route: Oral  
 predniSONE (STERAPRED) 5 mg dose pack 0 Sig: See administration instruction per 5mg dose pack Class: Normal  
 Pharmacy: [facility] [address] [place] [address] Ph #: [phone]  
 benzonatate (TESSALON PERLES) 100 mg capsule 0 Sig: Take 1 Cap by mouth three (3) times daily as needed for Cough for up to 7 days.   
 Class: Normal  
 Pharmacy: [facility] [address] [place] [address] #: [phone] Route: Oral  
  
Patient Instructions Rest and seek medical care for increased problems, any questions or concern including but not limited to the ones discussed with you here today. Drink plenty of fluids, use nasal steroids as directed, take zinc cold remedy 2-3 times a day, use saline nasal spray, muccinex as directed and,  honey and lemon tea. Rest and seek medical care for increased problems, any questions or concern including but not limited to the ones discussed with you here today. Avoid decongestants. Bronchitis: Care Instructions Your Care Instructions Bronchitis is inflammation of the bronchial tubes, which carry air to the lungs. The tubes swell and produce mucus, or phlegm. The mucus and inflamed bronchial tubes make you cough. You may have trouble breathing. Most cases of bronchitis are caused by viruses like those that cause colds. Antibiotics usually do not help and they may be harmful. Bronchitis usually develops rapidly and lasts about 2 to 3 weeks in otherwise healthy people. Follow-up care is a key part of your treatment and safety. Be sure to make and go to all appointments, and call your doctor if you are having problems. It's also a good idea to know your test results and keep a list of the medicines you take. How can you care for yourself at home? · Take all medicines exactly as prescribed. Call your doctor if you think you are having a problem with your medicine. · Get some extra rest. 
· Take an over-the-counter pain medicine, such as acetaminophen (Tylenol), ibuprofen (Advil, Motrin), or naproxen (Aleve) to reduce fever and relieve body aches. Read and follow all instructions on the label. · Do not take two or more pain medicines at the same time unless the doctor told you to.  Many pain medicines have acetaminophen, which is Tylenol. Too much acetaminophen (Tylenol) can be harmful. · Take an over-the-counter cough medicine that contains dextromethorphan to help quiet a dry, hacking cough so that you can sleep. Avoid cough medicines that have more than one active ingredient. Read and follow all instructions on the label. · Breathe moist air from a humidifier, hot shower, or sink filled with hot water. The heat and moisture will thin mucus so you can cough it out. · Do not smoke. Smoking can make bronchitis worse. If you need help quitting, talk to your doctor about stop-smoking programs and medicines. These can increase your chances of quitting for good. When should you call for help? Call 911 anytime you think you may need emergency care. For example, call if: 
  · You have severe trouble breathing.  
 Call your doctor now or seek immediate medical care if: 
  · You have new or worse trouble breathing.  
  · You cough up dark brown or bloody mucus (sputum).  
  · You have a new or higher fever.  
  · You have a new rash.  
 Watch closely for changes in your health, and be sure to contact your doctor if: 
  · You cough more deeply or more often, especially if you notice more mucus or a change in the color of your mucus.  
  · You are not getting better as expected. Where can you learn more? Go to http://janette-michelle.info/. Enter H333 in the search box to learn more about \"Bronchitis: Care Instructions. \" Current as of: December 6, 2017 Content Version: 11.7 © 3723-7395 [employer]. Care instructions adapted under license by [employer] (which disclaims liability or warranty for this information). If you have questions about a medical condition or this instruction, always ask your healthcare professional. [place] [address] any warranty or liability for your use of this information. Sinusitis: Care Instructions Your Care Instructions Sinusitis is an infection of the lining of the sinus cavities in your head. Sinusitis often follows a cold. It causes pain and pressure in your head and face. In most cases, sinusitis gets better on its own in 1 to 2 weeks. But some mild symptoms may last for several weeks. Sometimes antibiotics are needed. Follow-up care is a key part of your treatment and safety. Be sure to make and go to all appointments, and call your doctor if you are having problems. It's also a good idea to know your test results and keep a list of the medicines you take. How can you care for yourself at home? · Take an over-the-counter pain medicine, such as acetaminophen (Tylenol), ibuprofen (Advil, Motrin), or naproxen (Aleve). Read and follow all instructions on the label. · If the doctor prescribed antibiotics, take them as directed. Do not stop taking them just because you feel better. You need to take the full course of antibiotics. · Be careful when taking over-the-counter cold or flu medicines and Tylenol at the same time. Many of these medicines have acetaminophen, which is Tylenol. Read the labels to make sure that you are not taking more than the recommended dose. Too much acetaminophen (Tylenol) can be harmful. · Breathe warm, moist air from a steamy shower, a hot bath, or a sink filled with hot water. Avoid cold, dry air. Using a humidifier in your home may help. Follow the directions for cleaning the machine. · Use saline (saltwater) nasal washes to help keep your nasal passages open and wash out mucus and bacteria. You can buy saline nose drops at a grocery store or drugstore. Or you can make your own at home by adding 1 teaspoon of salt and 1 teaspoon of baking soda to 2 cups of distilled water. If you make your own, fill a bulb syringe with the solution, insert the tip into your nostril, and squeeze gently. [place] your nose.  
· Put a hot, wet towel or a warm gel pack on your face 3 or 4 times a day for 5 to 10 minutes each time. · Try a decongestant nasal spray like oxymetazoline (Afrin). Do not use it for more than 3 days in a row. Using it for more than 3 days can make your congestion worse. When should you call for help? Call your doctor now or seek immediate medical care if: 
  · You have new or worse swelling or redness in your face or around your eyes.  
  · You have a new or higher fever.  
 Watch closely for changes in your health, and be sure to contact your doctor if: 
  · You have new or worse facial pain.  
  · The mucus from your nose becomes thicker (like pus) or has new blood in it.  
  · You are not getting better as expected. Where can you learn more? Go to http://janette-michelle.info/. Enter X460 in the search box to learn more about \"Sinusitis: Care Instructions. \" Current as of: May 12, 2017 Content Version: 11.7 © 4319-7518 [employer]. Care instructions adapted under license by [employer] (which disclaims liability or warranty for this information). If you have questions about a medical condition or this instruction, always ask your healthcare professional. [place] [address] any warranty or liability for your use of this information. Laryngitis: Care Instructions Your Care Instructions Laryngitis is an inflammation of the voice box (larynx) that causes your voice to become raspy or hoarse. It can be short-lived or long-lasting. Most of the time, laryngitis comes on quickly and lasts as long as 2 weeks. It is caused by overuse, irritation, or infection of the vocal cords inside the larynx. Some of the most common causes are a cold, the flu, or allergies. Loud talking, shouting, cheering, or singing also can cause laryngitis. Stomach acid that backs up into the throat also can make you lose your voice. Resting your voice and taking other steps at home can help you get your voice back. Follow-up care is a key part of your treatment and safety. Be sure to make and go to all appointments, and call your doctor if you are having problems. It's also a good idea to know your test results and keep a list of the medicines you take. How can you care for yourself at home? · Follow your doctor's directions for treating the condition that caused you to lose your voice. If your doctor prescribed antibiotics, take them as directed. Do not stop taking them just because you feel better. You need to take the full course of antibiotics. · Before you use cough and cold medicines, check the label. They may not be safe for young children or for people with certain health problems. · Try to keep stomach acid from backing up into your throat. Do not eat just before bedtime. Reduce the amount of coffee and alcohol you drink, and eat healthy foods. Taking over-the-counter acid reducers can help when these steps are not enough. In some cases, you may need prescription medicine. · Rest your voice. You do not have to stop speaking, but use your voice as little as possible. Speak softly but do not whisper; whispering can bother your larynx more than speaking softly. Avoid talking on the telephone or trying to speak loudly. · Try not to clear your throat. This can cause more irritation of your larynx. Take an over-the-counter cough suppressant (if your doctor recommends it) if you have a dry cough that does not produce mucus. · Do not smoke or allow others to smoke around you. If you need help quitting, talk to your doctor about stop-smoking programs and medicines. These can increase your chances of quitting for good. · Use a humidifier or vaporizer to add moisture to your bedroom. Humidity helps to thin the mucus in the nasal membranes that causes stuffiness or postnasal drip. Follow the directions for cleaning the machine.  
· Drink plenty of fluids, enough so that your urine is light yellow or clear like water. If you have kidney, heart, or liver disease and have to limit fluids, talk with your doctor before you increase the amount of fluids you drink. · Use saline (saltwater) nasal washes to help keep your nasal passages open and wash out mucus and bacteria. You can buy saline nose drops at a grocery store or drugstore. Or, you can make your own at home by mixing ½ teaspoon salt, 1 cup water (at room temperature), and ½ teaspoon baking soda. If you make your own, fill a bulb syringe with the solution, insert the tip into your nostril, and squeeze gently. [place] your nose. When should you call for help? Call 911 anytime you think you may need emergency care. For example, call if: 
  · You have trouble breathing.  
 Call your doctor now or seek immediate medical care if: 
  · You have new or worse pain.  
  · You have trouble swallowing.  
 Watch closely for changes in your health, and be sure to contact your doctor if: 
  · You do not get better as expected. Where can you learn more? Go to http://janette-michelle.info/. Enter Z591 in the search box to learn more about \"Laryngitis: Care Instructions. \" Current as of: May 12, 2017 Content Version: 11.7 © 2900-2606 [employer]. Care instructions adapted under license by [employer] (which disclaims liability or warranty for this information). If you have questions about a medical condition or this instruction, always ask your healthcare professional. [place] [address] any warranty or liability for your use of this information. Introducing [facility]! Dear Winifred Arboleda: Thank you for requesting a InboxQ account. Our records indicate that you already have an active InboxQ account. You can access your account anytime at [email or web address]/"[email or web address] Did you know that you can access your hospital and ER discharge instructions at any time in Vestiage? You can also review all of your test results from your hospital stay or ER visit. Additional Information If you have questions, please visit the Frequently Asked Questions section of the Vestiage website at [email or web address] Remember, Vestiage is NOT to be used for urgent needs. For medical emergencies, dial 911. Now available from your iPhone and Android! Please provide this summary of care documentation to your next provider. Your primary care clinician is listed as Asuncion Arriola. If you have any questions after today's visit, please call [phone].

## 2018-08-30 NOTE — PROGRESS NOTES
Patient is a 35 y.o. female presenting with cold symptoms. The history is provided by the patient. Cold Symptoms   The history is provided by the patient. This is a new problem. The current episode started more than 2 days ago. The problem occurs constantly. The problem has been gradually worsening (now with sinus pressure and laryngitis). The cough is non-productive. There has been no fever. Associated symptoms include chest pain (sometimes with cough), rhinorrhea and sore throat (with couch sometimes). Pertinent negatives include no chills, no ear congestion, no ear pain, no headaches, no shortness of breath, no wheezing, no nausea, no vomiting and no confusion. Associated symptoms comments: Increased sinus pain/pressure. She has tried decongestants for the symptoms. She is not a smoker. Past Medical History:   Diagnosis Date    Heart murmur     History of      Preeclampsia         Past Surgical History:   Procedure Laterality Date    HX  SECTION      HX TUBAL LIGATION Bilateral 2015    HX WISDOM TEETH EXTRACTION           Family History   Problem Relation Age of Onset    Hypertension Mother     Diabetes Father     Arthritis-osteo Maternal Aunt     Stroke Maternal Grandmother         Social History     Social History    Marital status:      Spouse name: N/A    Number of children: N/A    Years of education: N/A     Occupational History    Not on file. Social History Main Topics    Smoking status: Never Smoker    Smokeless tobacco: Never Used    Alcohol use 0.0 oz/week     1 - 2 Standard drinks or equivalent per week      Comment: RARE    Drug use: No    Sexual activity: Yes     Partners: Male     Birth control/ protection: None, Pill, Surgical      Comment:      Other Topics Concern    Not on file     Social History Narrative                ALLERGIES: Review of patient's allergies indicates no known allergies.     Review of Systems Constitutional: Negative for activity change, appetite change, chills, fatigue and fever. HENT: Positive for congestion, rhinorrhea, sinus pain, sinus pressure and sore throat (with couch sometimes). Negative for ear pain. Respiratory: Negative for shortness of breath and wheezing. Cardiovascular: Positive for chest pain (sometimes with cough). Gastrointestinal: Negative. Negative for nausea and vomiting. Musculoskeletal: Negative. Neurological: Negative for headaches. Psychiatric/Behavioral: Negative for confusion. Vitals:    08/30/18 1201 08/30/18 1202   BP:  134/74   Pulse:  73   Resp:  18   Temp:  97.2 °F (36.2 °C)   SpO2:  98%   Weight: 198 lb (89.8 kg)    Height: 5' 6\" (1.676 m)        Physical Exam   Constitutional: She is oriented to person, place, and time. She appears well-developed and well-nourished. HENT:   Head: Normocephalic. Mouth/Throat: No oropharyngeal exudate. Laryngitis but able to speak in a raspy voice, Airway patent and managing secretions. Nasal congestion, mild maxillary sinus pain, PND, BL fluid behind the TM's without erythema     Eyes: Conjunctivae are normal. Pupils are equal, round, and reactive to light. Neck: Normal range of motion. Neck supple. No tracheal deviation present. No thyromegaly present. Cardiovascular: Normal rate, regular rhythm and normal heart sounds. No murmur heard. Pulmonary/Chest: Effort normal and breath sounds normal. No respiratory distress. She has no wheezes. She has no rales. Musculoskeletal: Normal range of motion. She exhibits no edema or tenderness. Lymphadenopathy:     She has no cervical adenopathy. Neurological: She is alert and oriented to person, place, and time. Skin: Skin is warm. No erythema. Psychiatric: She has a normal mood and affect. Her behavior is normal.   Nursing note and vitals reviewed. MDM    Procedures                         ICD-10-CM ICD-9-CM    1.  Acute bronchitis, unspecified organism J20.9 466.0    2. Acute non-recurrent maxillary sinusitis J01.00 461.0    3. Laryngitis J04.0 464.00      Medications Ordered Today   Medications    amoxicillin-clavulanate (AUGMENTIN) 875-125 mg per tablet     Sig: Take 1 Tab by mouth two (2) times a day for 10 days. Dispense:  20 Tab     Refill:  0    predniSONE (STERAPRED) 5 mg dose pack     Sig: See administration instruction per 5mg dose pack     Dispense:  21 Tab     Refill:  0    benzonatate (TESSALON PERLES) 100 mg capsule     Sig: Take 1 Cap by mouth three (3) times daily as needed for Cough for up to 7 days. Dispense:  30 Cap     Refill:  0     The patients condition was discussed with the patient and they understand. The patient is to follow up with primary care doctor ,If signs and symptoms become worse the pt is to go to the ER. The patient is to take medications as prescribed.

## 2018-08-30 NOTE — LETTER
NOTIFICATION RETURN TO WORK / SCHOOL 
 
8/30/2018 12:35 PM 
 
Ms. Claudia Ernandez [facility] [id number] To Whom It May Concern: 
 
Claudia Ernandez is currently under the care of [address] [place] [address]. She will return to work/school on: 9/1/18 If there are questions or concerns please have the patient contact our office. Sincerely, Emma Trujillo 420

## 2018-08-30 NOTE — PATIENT INSTRUCTIONS
Rest and seek medical care for increased problems, any questions or concern including but not limited to the ones discussed with you here today. Drink plenty of fluids, use nasal steroids as directed, take zinc cold remedy 2-3 times a day, use saline nasal spray, muccinex as directed and,  honey and lemon tea. Rest and seek medical care for increased problems, any questions or concern including but not limited to the ones discussed with you here today. Avoid decongestants. Bronchitis: Care Instructions  Your Care Instructions    Bronchitis is inflammation of the bronchial tubes, which carry air to the lungs. The tubes swell and produce mucus, or phlegm. The mucus and inflamed bronchial tubes make you cough. You may have trouble breathing. Most cases of bronchitis are caused by viruses like those that cause colds. Antibiotics usually do not help and they may be harmful. Bronchitis usually develops rapidly and lasts about 2 to 3 weeks in otherwise healthy people. Follow-up care is a key part of your treatment and safety. Be sure to make and go to all appointments, and call your doctor if you are having problems. It's also a good idea to know your test results and keep a list of the medicines you take. How can you care for yourself at home? · Take all medicines exactly as prescribed. Call your doctor if you think you are having a problem with your medicine. · Get some extra rest.  · Take an over-the-counter pain medicine, such as acetaminophen (Tylenol), ibuprofen (Advil, Motrin), or naproxen (Aleve) to reduce fever and relieve body aches. Read and follow all instructions on the label. · Do not take two or more pain medicines at the same time unless the doctor told you to. Many pain medicines have acetaminophen, which is Tylenol. Too much acetaminophen (Tylenol) can be harmful.   · Take an over-the-counter cough medicine that contains dextromethorphan to help quiet a dry, hacking cough so that you can sleep. Avoid cough medicines that have more than one active ingredient. Read and follow all instructions on the label. · Breathe moist air from a humidifier, hot shower, or sink filled with hot water. The heat and moisture will thin mucus so you can cough it out. · Do not smoke. Smoking can make bronchitis worse. If you need help quitting, talk to your doctor about stop-smoking programs and medicines. These can increase your chances of quitting for good. When should you call for help? Call 911 anytime you think you may need emergency care. For example, call if:    · You have severe trouble breathing.    Call your doctor now or seek immediate medical care if:    · You have new or worse trouble breathing.     · You cough up dark brown or bloody mucus (sputum).     · You have a new or higher fever.     · You have a new rash.    Watch closely for changes in your health, and be sure to contact your doctor if:    · You cough more deeply or more often, especially if you notice more mucus or a change in the color of your mucus.     · You are not getting better as expected. Where can you learn more? Go to http://janette-michelle.info/. Enter H333 in the search box to learn more about \"Bronchitis: Care Instructions. \"  Current as of: December 6, 2017  Content Version: 11.7  © 6145-2143 [employer]. Care instructions adapted under license by [employer] (which disclaims liability or warranty for this information). If you have questions about a medical condition or this instruction, always ask your healthcare professional. [place] [address] any warranty or liability for your use of this information. Sinusitis: Care Instructions  Your Care Instructions    Sinusitis is an infection of the lining of the sinus cavities in your head. Sinusitis often follows a cold. It causes pain and pressure in your head and face.   In most cases, sinusitis gets better on its own in 1 to 2 weeks. But some mild symptoms may last for several weeks. Sometimes antibiotics are needed. Follow-up care is a key part of your treatment and safety. Be sure to make and go to all appointments, and call your doctor if you are having problems. It's also a good idea to know your test results and keep a list of the medicines you take. How can you care for yourself at home? · Take an over-the-counter pain medicine, such as acetaminophen (Tylenol), ibuprofen (Advil, Motrin), or naproxen (Aleve). Read and follow all instructions on the label. · If the doctor prescribed antibiotics, take them as directed. Do not stop taking them just because you feel better. You need to take the full course of antibiotics. · Be careful when taking over-the-counter cold or flu medicines and Tylenol at the same time. Many of these medicines have acetaminophen, which is Tylenol. Read the labels to make sure that you are not taking more than the recommended dose. Too much acetaminophen (Tylenol) can be harmful. · Breathe warm, moist air from a steamy shower, a hot bath, or a sink filled with hot water. Avoid cold, dry air. Using a humidifier in your home may help. Follow the directions for cleaning the machine. · Use saline (saltwater) nasal washes to help keep your nasal passages open and wash out mucus and bacteria. You can buy saline nose drops at a grocery store or drugstore. Or you can make your own at home by adding 1 teaspoon of salt and 1 teaspoon of baking soda to 2 cups of distilled water. If you make your own, fill a bulb syringe with the solution, insert the tip into your nostril, and squeeze gently. Duana Glow your nose. · Put a hot, wet towel or a warm gel pack on your face 3 or 4 times a day for 5 to 10 minutes each time. · Try a decongestant nasal spray like oxymetazoline (Afrin). Do not use it for more than 3 days in a row. Using it for more than 3 days can make your congestion worse.   When should you call for help?  Call your doctor now or seek immediate medical care if:    · You have new or worse swelling or redness in your face or around your eyes.     · You have a new or higher fever.    Watch closely for changes in your health, and be sure to contact your doctor if:    · You have new or worse facial pain.     · The mucus from your nose becomes thicker (like pus) or has new blood in it.     · You are not getting better as expected. Where can you learn more? Go to http://janette-michelle.info/. Enter A468 in the search box to learn more about \"Sinusitis: Care Instructions. \"  Current as of: May 12, 2017  Content Version: 11.7  © 2478-9348 [employer]. Care instructions adapted under license by [employer] (which disclaims liability or warranty for this information). If you have questions about a medical condition or this instruction, always ask your healthcare professional. [place] [address] any warranty or liability for your use of this information. Laryngitis: Care Instructions  Your Care Instructions    Laryngitis is an inflammation of the voice box (larynx) that causes your voice to become raspy or hoarse. It can be short-lived or long-lasting. Most of the time, laryngitis comes on quickly and lasts as long as 2 weeks. It is caused by overuse, irritation, or infection of the vocal cords inside the larynx. Some of the most common causes are a cold, the flu, or allergies. Loud talking, shouting, cheering, or singing also can cause laryngitis. Stomach acid that backs up into the throat also can make you lose your voice. Resting your voice and taking other steps at home can help you get your voice back. Follow-up care is a key part of your treatment and safety. Be sure to make and go to all appointments, and call your doctor if you are having problems. It's also a good idea to know your test results and keep a list of the medicines you take.   How can you care for yourself at home? · Follow your doctor's directions for treating the condition that caused you to lose your voice. If your doctor prescribed antibiotics, take them as directed. Do not stop taking them just because you feel better. You need to take the full course of antibiotics. · Before you use cough and cold medicines, check the label. They may not be safe for young children or for people with certain health problems. · Try to keep stomach acid from backing up into your throat. Do not eat just before bedtime. Reduce the amount of coffee and alcohol you drink, and eat healthy foods. Taking over-the-counter acid reducers can help when these steps are not enough. In some cases, you may need prescription medicine. · Rest your voice. You do not have to stop speaking, but use your voice as little as possible. Speak softly but do not whisper; whispering can bother your larynx more than speaking softly. Avoid talking on the telephone or trying to speak loudly. · Try not to clear your throat. This can cause more irritation of your larynx. Take an over-the-counter cough suppressant (if your doctor recommends it) if you have a dry cough that does not produce mucus. · Do not smoke or allow others to smoke around you. If you need help quitting, talk to your doctor about stop-smoking programs and medicines. These can increase your chances of quitting for good. · Use a humidifier or vaporizer to add moisture to your bedroom. Humidity helps to thin the mucus in the nasal membranes that causes stuffiness or postnasal drip. Follow the directions for cleaning the machine. · Drink plenty of fluids, enough so that your urine is light yellow or clear like water. If you have kidney, heart, or liver disease and have to limit fluids, talk with your doctor before you increase the amount of fluids you drink. · Use saline (saltwater) nasal washes to help keep your nasal passages open and wash out mucus and bacteria.  You can buy saline nose drops at a grocery store or drugstore. Or, you can make your own at home by mixing ½ teaspoon salt, 1 cup water (at room temperature), and ½ teaspoon baking soda. If you make your own, fill a bulb syringe with the solution, insert the tip into your nostril, and squeeze gently. [place] your nose. When should you call for help? Call 911 anytime you think you may need emergency care. For example, call if:    · You have trouble breathing.    Call your doctor now or seek immediate medical care if:    · You have new or worse pain.     · You have trouble swallowing.    Watch closely for changes in your health, and be sure to contact your doctor if:    · You do not get better as expected. Where can you learn more? Go to http://janette-michelle.info/. Enter T517 in the search box to learn more about \"Laryngitis: Care Instructions. \"  Current as of: May 12, 2017  Content Version: 11.7  © 9816-1548 [employer]. Care instructions adapted under license by [employer] (which disclaims liability or warranty for this information). If you have questions about a medical condition or this instruction, always ask your healthcare professional. [address] any warranty or liability for your use of this information.

## 2018-09-04 ENCOUNTER — OFFICE VISIT (OUTPATIENT)
Dept: INTERNAL MEDICINE CLINIC | Age: 33
End: 2018-09-04

## 2018-09-04 VITALS
HEIGHT: 66 IN | OXYGEN SATURATION: 99 % | SYSTOLIC BLOOD PRESSURE: 116 MMHG | TEMPERATURE: 97.9 F | RESPIRATION RATE: 16 BRPM | HEART RATE: 80 BPM | BODY MASS INDEX: 32.47 KG/M2 | DIASTOLIC BLOOD PRESSURE: 79 MMHG | WEIGHT: 202 LBS

## 2018-09-04 DIAGNOSIS — Z01.818 PREOP EXAM FOR INTERNAL MEDICINE: Primary | ICD-10-CM

## 2018-09-04 DIAGNOSIS — E55.9 VITAMIN D DEFICIENCY: ICD-10-CM

## 2018-09-04 DIAGNOSIS — F41.9 ANXIETY AND DEPRESSION: ICD-10-CM

## 2018-09-04 DIAGNOSIS — Z23 ENCOUNTER FOR IMMUNIZATION: ICD-10-CM

## 2018-09-04 DIAGNOSIS — F32.A ANXIETY AND DEPRESSION: ICD-10-CM

## 2018-09-04 RX ORDER — VENLAFAXINE HYDROCHLORIDE 37.5 MG/1
37.5 CAPSULE, EXTENDED RELEASE ORAL DAILY
Qty: 30 CAP | Refills: 3 | Status: SHIPPED | OUTPATIENT
Start: 2018-09-04 | End: 2018-10-19

## 2018-09-04 NOTE — PROGRESS NOTES
HISTORY OF PRESENT ILLNESS Claire Galvan is a 35 y.o. female. HPI Last here 3/13/18. Pt is here for routine care. Pt will be having breast reduction surg 10/2/18 Pt denies cp, sob, palpitations, orthopnea, claudication, PND, and new swelling in legs Pt can sleep laying flat Pt can walk up a set of stairs Pt can walk around the mall Pt can vacuum and do laundry Functional mets >>4 Will get EKG 
  
BP is 116/79 
   
Wt is up 7 lbs x lov Discussed diet and weight loss  
   
Reviewed labs 1/18 Will get labs today 
  
Lov, had her take once weekly and then daily vit D Pt has not been taking this She took the booster, but then nothing further Pt states the booster gave her energy and made her feel better 
  
Lov, started wellbutrin 150mg daily Pt stopped taking this med because she thought she might be feeling better d/t placebo effect However she states she is still having sx, especially with anxiety She sometimes experiences anxiety attack sx, and sometimes cries Her sx are especially d/t stress from work Pt went on vacation recently and was not excited, she just wanted to sleep Wellbutrin did not seem to help, per pt Will start effexor 37.5mg Pt wonders whether she should talk to someone Discussed this likely being helpful Provided info to help set up with counselor Recall zoloft helped but caused decreased libido Pt states she recently had bronchitis She was treated with prednisone and augmentin Pt stopped prednisone early 
   
PREVENTIVE: 
Colonoscopy: 
Pap: Dr. Rukhsana Mcclellan, 4/17, annually, due 4/18 Mammogram: not yet needed Dexa: not yet needed Tdap: 01/16/18  
Pneumovax: not yet needed Shingrix: not yet needed Flu shot: 9/18 Eye exam: Dr. Ruben Hennessy, 8/17 Lipids: 1/18 LDL 91 Patient Active Problem List  
 Diagnosis Date Noted  Anxiety and depression 01/16/2018  Mixed anxiety and depressive disorder 04/04/2016 Current Outpatient Prescriptions Medication Sig Dispense Refill  amoxicillin-clavulanate (AUGMENTIN) 875-125 mg per tablet Take 1 Tab by mouth two (2) times a day for 10 days. 20 Tab 0  predniSONE (STERAPRED) 5 mg dose pack See administration instruction per 5mg dose pack 21 Tab 0  
 benzonatate (TESSALON PERLES) 100 mg capsule Take 1 Cap by mouth three (3) times daily as needed for Cough for up to 7 days. 30 Cap 0  cyclobenzaprine (FLEXERIL) 10 mg tablet Take 1 Tab by mouth three (3) times daily as needed for Muscle Spasm(s). 20 Tab 0  
 buPROPion XL (WELLBUTRIN XL) 150 mg tablet Take 1 Tab by mouth every morning. 30 Tab 2  
 ergocalciferol (ERGOCALCIFEROL) 50,000 unit capsule Take 1 Cap by mouth every seven (7) days. 8 Cap 0  cyclobenzaprine (FLEXERIL) 5 mg tablet Take 1 Tab by mouth nightly. 20 Tab 0 Past Surgical History:  
Procedure Laterality Date  HX  SECTION    
 HX TUBAL LIGATION Bilateral 2015  HX WISDOM TEETH EXTRACTION Lab Results Component Value Date/Time WBC 6.2 2018 10:24 AM  
HGB 11.4 2018 10:24 AM  
HCT 34.7 2018 10:24 AM  
PLATELET 927  10:24 AM  
MCV 81 2018 10:24 AM  
 
Lab Results Component Value Date/Time Cholesterol, total 159 2018 10:24 AM  
HDL Cholesterol 57 2018 10:24 AM  
LDL, calculated 91 2018 10:24 AM  
Triglyceride 55 2018 10:24 AM  
 
Lab Results Component Value Date/Time GFR est non- 2018 10:24 AM  
GFR est  2018 10:24 AM  
Creatinine 0.76 2018 10:24 AM  
BUN 10 2018 10:24 AM  
Sodium 144 2018 10:24 AM  
Potassium 4.2 2018 10:24 AM  
Chloride 104 2018 10:24 AM  
CO2 21 2018 10:24 AM  
  
 
Review of Systems Respiratory: Negative for shortness of breath and wheezing. Cardiovascular: Negative for chest pain, palpitations, orthopnea, claudication, leg swelling and PND.   
 
 
Physical Exam  
 Constitutional: She is oriented to person, place, and time. She appears well-developed and well-nourished. No distress. HENT:  
Head: Normocephalic and atraumatic. Right Ear: External ear normal.  
Left Ear: External ear normal.  
Mouth/Throat: Oropharynx is clear and moist. No oropharyngeal exudate. Eyes: Conjunctivae and EOM are normal. Pupils are equal, round, and reactive to light. Right eye exhibits no discharge. Left eye exhibits no discharge. No scleral icterus. Neck: Normal range of motion. Neck supple. No carotid bruits Cardiovascular: Normal rate, regular rhythm, normal heart sounds and intact distal pulses. Exam reveals no gallop and no friction rub. No murmur heard. Pulmonary/Chest: Effort normal and breath sounds normal. No respiratory distress. She has no wheezes. She has no rales. She exhibits no tenderness. Abdominal: Soft. She exhibits no distension and no mass. There is no tenderness. There is no rebound and no guarding. Musculoskeletal: Normal range of motion. She exhibits no edema, tenderness or deformity. Lymphadenopathy:  
  She has no cervical adenopathy. Neurological: She is alert and oriented to person, place, and time. Coordination normal.  
Skin: Skin is warm and dry. No rash noted. She is not diaphoretic. No erythema. No pallor. Psychiatric: She has a normal mood and affect. Her behavior is normal.  
 
 
ASSESSMENT and PLAN 
  ICD-10-CM ICD-9-CM 1. Preop exam for internal medicine Pt is low risk for a low risk surg with good functional mets, nl EKG, may proceed to surg without additional cardiac workup Z01.818 V72.83 AMB POC EKG ROUTINE W/ 12 LEADS, INTER & REP  
   VITAMIN D, 25 HYDROXY  
   METABOLIC PANEL, COMPREHENSIVE  
   CBC W/O DIFF 2. Vitamin D deficiency Check level and treat prn, she is not taking any OTC vit D 
 E55.9 268.9 VITAMIN D, 25 HYDROXY  
   METABOLIC PANEL, COMPREHENSIVE  
   CBC W/O DIFF 3. Anxiety and depression Will start effexor 37.5mg, wellbutrin did not work, zoloft caused difficulty with libido, will set up with counselor as well F41.9 300.00 REFERRAL TO PSYCHIATRY  
 F32.9 311 Scribed by Marv Carreon of [address] [place] [address] [address], as dictated by Dr. Ava Otto. Current diagnosis and concerns discussed with pt at length. Pt understands risks and benefits or current treatment plan and medications, and accepts the treatment and medication with any possible risks. Pt asks appropriate questions, which were answered. Pt was instructed to call with any concerns or problems. This note will not be viewable in [address]

## 2018-09-04 NOTE — MR AVS SNAPSHOT
[address] [place] [address] 
[phone] Patient: Dena Campbell MRN: [id number] MWM:4[birth year]0150 Visit Information Date & Time Provider Department Dept. Phone Encounter #  
 9/4/2018 11:45 AM Marquise Garcia, [address] [phone] [id number] Follow-up Instructions Return in about 6 weeks (around 10/16/2018). Upcoming Health Maintenance Date Due Influenza Age 5 to Adult 8/1/2018 PAP AKA CERVICAL CYTOLOGY 4/10/2020 DTaP/Tdap/Td series (2 - Td) 1/16/2028 Allergies as of 9/4/2018  Review Complete On: 9/4/2018 By: Marquise Garcia MD  
  
 Severity Noted Reaction Type Reactions Prednisone  09/04/2018    Myalgia Current Immunizations  Reviewed on 1/16/2018 Name Date Influenza Vaccine 10/15/2017 Tdap 1/16/2018 Not reviewed this visit You Were Diagnosed With   
  
 Codes Comments Preop exam for internal medicine    -  Primary ICD-10-CM: Z44.522 ICD-9-CM: V72.83 Vitamin D deficiency     ICD-10-CM: E55.9 ICD-9-CM: 268.9 Anxiety and depression     ICD-10-CM: F41.9, F32.9 ICD-9-CM: 300.00, 311 Vitals BP Pulse Temp Resp Height(growth percentile) Weight(growth percentile) 116/79 (BP 1 Location: Left arm, BP Patient Position: Sitting) 80 97.9 °F (36.6 °C) (Oral) 16 5' 6\" (1.676 m) 202 lb (91.6 kg) LMP SpO2 BMI OB Status Smoking Status 08/20/2018 99% 32.6 kg/m2 Having regular periods Never Smoker Vitals History BMI and BSA Data Body Mass Index Body Surface Area  
 32.6 kg/m 2 2.07 m 2 Preferred Pharmacy Pharmacy Name Phone [facility] [address], [facility] [address] [phone] Your Updated Medication List  
  
   
This list is accurate as of 9/4/18 12:10 PM.  Always use your most recent med list.  
  
  
  
  
 venlafaxine-SR 37.5 mg capsule Commonly known as:  EFFEXOR-XR Take 1 Cap by mouth daily. Prescriptions Sent to Pharmacy Refills  
 venlafaxine-SR (EFFEXOR-XR) 37.5 mg capsule 3 Sig: Take 1 Cap by mouth daily. Class: Normal  
 Pharmacy: [facility] [address] #: [phone] Route: Oral  
  
We Performed the Following AMB POC EKG ROUTINE W/ 12 LEADS, INTER & REP [55445 CPT(R)] CBC W/O DIFF [72567 CPT(R)] METABOLIC PANEL, COMPREHENSIVE [57718 CPT(R)] REFERRAL TO PSYCHIATRY [REF91 Custom] VITAMIN D, 25 HYDROXY [id number] CPT(R)] Follow-up Instructions Return in about 6 weeks (around 10/16/2018). Referral Information Referral ID Referred By Referred To  
  
 [id number] [address], [address] Phone: [phone] Fax: [phone] Visits Status Start Date End Date 1 New Request 9/4/18 9/4/19 If your referral has a status of pending review or denied, additional information will be sent to support the outcome of this decision. Introducing [facility]! Dear Elina Croft: Thank you for requesting a [email or web address] account. Our records indicate that you already have an active [email or web address] account. You can access your account anytime at [email or web address]. [email or web address] Did you know that you can access your hospital and ER discharge instructions at any time in [email or web address]? You can also review all of your test results from your hospital stay or ER visit. Additional Information If you have questions, please visit the Frequently Asked Questions section of the [email or web address] website at [email or web address] Remember, [email or web address] is NOT to be used for urgent needs. For medical emergencies, dial 911. Now available from your iPhone and Android! Please provide this summary of care documentation to your next provider. Your primary care clinician is listed as Rhoderick Romberg. If you have any questions after today's visit, please call [phone].

## 2018-09-05 LAB
25(OH)D3+25(OH)D2 SERPL-MCNC: 15.4 NG/ML (ref 30–100)
ALBUMIN SERPL-MCNC: 4.4 G/DL (ref 3.5–5.5)
ALBUMIN/GLOB SERPL: 1.6 {RATIO} (ref 1.2–2.2)
ALP SERPL-CCNC: 48 IU/L (ref 39–117)
ALT SERPL-CCNC: 14 IU/L (ref 0–32)
AST SERPL-CCNC: 16 IU/L (ref 0–40)
BILIRUB SERPL-MCNC: 0.3 MG/DL (ref 0–1.2)
BUN SERPL-MCNC: 10 MG/DL (ref 6–20)
BUN/CREAT SERPL: 13 (ref 9–23)
CALCIUM SERPL-MCNC: 9.8 MG/DL (ref 8.7–10.2)
CHLORIDE SERPL-SCNC: 104 MMOL/L (ref 96–106)
CO2 SERPL-SCNC: 26 MMOL/L (ref 20–29)
CREAT SERPL-MCNC: 0.78 MG/DL (ref 0.57–1)
ERYTHROCYTE [DISTWIDTH] IN BLOOD BY AUTOMATED COUNT: 15 % (ref 12.3–15.4)
GLOBULIN SER CALC-MCNC: 2.8 G/DL (ref 1.5–4.5)
GLUCOSE SERPL-MCNC: 83 MG/DL (ref 65–99)
HCT VFR BLD AUTO: 34.5 % (ref 34–46.6)
HGB BLD-MCNC: 11.7 G/DL (ref 11.1–15.9)
MCH RBC QN AUTO: 27 PG (ref 26.6–33)
MCHC RBC AUTO-ENTMCNC: 33.9 G/DL (ref 31.5–35.7)
MCV RBC AUTO: 80 FL (ref 79–97)
PLATELET # BLD AUTO: 280 X10E3/UL (ref 150–379)
POTASSIUM SERPL-SCNC: 3.8 MMOL/L (ref 3.5–5.2)
PROT SERPL-MCNC: 7.2 G/DL (ref 6–8.5)
RBC # BLD AUTO: 4.33 X10E6/UL (ref 3.77–5.28)
SODIUM SERPL-SCNC: 145 MMOL/L (ref 134–144)
WBC # BLD AUTO: 7.7 X10E3/UL (ref 3.4–10.8)

## 2018-09-05 NOTE — PROGRESS NOTES
vit D is low--the patient needs 50,000 units weekly for 8 weeks then start a daily 2000unit supplement instead.

## 2018-09-06 ENCOUNTER — DOCUMENTATION ONLY (OUTPATIENT)
Dept: INTERNAL MEDICINE CLINIC | Age: 33
End: 2018-09-06

## 2018-09-06 NOTE — PROGRESS NOTES
Pre-op form faxed, per pt request, to number provided on form. [phone] and [phone]. Fax confirmation received. Copy of form mailed out to pt. Copy placed in fax confirmation. Original form scanned to chart.

## 2018-09-07 RX ORDER — ERGOCALCIFEROL 1.25 MG/1
50000 CAPSULE ORAL
Qty: 8 CAP | Refills: 0 | Status: SHIPPED | OUTPATIENT
Start: 2018-09-07 | End: 2019-02-17

## 2018-09-10 RX ORDER — CYCLOBENZAPRINE HCL 10 MG
10 TABLET ORAL
Qty: 30 TAB | Refills: 0 | Status: SHIPPED | OUTPATIENT
Start: 2018-09-10 | End: 2019-04-24

## 2018-10-02 ENCOUNTER — HOSPITAL ENCOUNTER (OUTPATIENT)
Dept: LAB | Age: 33
Discharge: HOME | End: 2018-10-02

## 2018-10-19 ENCOUNTER — OFFICE VISIT (OUTPATIENT)
Dept: INTERNAL MEDICINE CLINIC | Age: 33
End: 2018-10-19

## 2018-10-19 VITALS
DIASTOLIC BLOOD PRESSURE: 75 MMHG | TEMPERATURE: 98.4 F | HEART RATE: 92 BPM | BODY MASS INDEX: 31.5 KG/M2 | SYSTOLIC BLOOD PRESSURE: 124 MMHG | HEIGHT: 66 IN | RESPIRATION RATE: 16 BRPM | WEIGHT: 196 LBS | OXYGEN SATURATION: 100 %

## 2018-10-19 DIAGNOSIS — Z98.890 S/P BILATERAL BREAST REDUCTION: ICD-10-CM

## 2018-10-19 DIAGNOSIS — E55.9 VITAMIN D DEFICIENCY: ICD-10-CM

## 2018-10-19 DIAGNOSIS — F32.A ANXIETY AND DEPRESSION: Primary | ICD-10-CM

## 2018-10-19 DIAGNOSIS — F51.01 PRIMARY INSOMNIA: ICD-10-CM

## 2018-10-19 DIAGNOSIS — F41.9 ANXIETY AND DEPRESSION: Primary | ICD-10-CM

## 2018-10-19 RX ORDER — TRAZODONE HYDROCHLORIDE 50 MG/1
50 TABLET ORAL
Qty: 30 TAB | Refills: 2 | Status: SHIPPED | OUTPATIENT
Start: 2018-10-19 | End: 2019-04-24

## 2018-10-19 RX ORDER — TRAZODONE HYDROCHLORIDE 50 MG/1
50 TABLET ORAL
Qty: 30 TAB | Refills: 2 | Status: SHIPPED | OUTPATIENT
Start: 2018-10-19 | End: 2018-10-19 | Stop reason: SDUPTHER

## 2018-10-19 NOTE — PROGRESS NOTES
HISTORY OF PRESENT ILLNESS Juan Jenkins is a 35 y.o. female. HPI Last here 9/4/18. Pt is here for routine care. 
  
Pt had breast reduction surg 10/2/18 Pt states the surgery went well but that it has been a difficult recovery period Pt tried both percocet and dilaudid but this both made her too sick, even with nausea medicine She is taking tylenol as well as benadryl to sleep at night Benadryl has not helped much with sleep, neither has melatonin The tylenol helps her pain somewhat Pt had her stitches taken out yesterday Pt does not see her surg until next week She is supposed to go back to work next week but she states she does not feel ready Will start trazadone to help with sleep 
  
BP is 124/75 
   
Wt is down 6 lbs x lov Discussed diet and weight loss  
   
Reviewed labs 9/18 Will get labs today 
  
Pt previously tried wellbutrin for anxiety Lov, started effexor 37.5mg Pt stopped taking this after her surgery d/t not feeling well She had HAs while on this med However her anxiety and stress have improved Discussed trying buspar For now pt will hold off and try trazadone for sleep to see if this helps Recall zoloft helped but caused decreased libido Ordered 50K U vit D 
   
PREVENTIVE: 
Colonoscopy: 
Pap: Dr. Murtaza Mondragon, 4/17, annually, due 4/18 Mammogram: not yet needed Dexa: not yet needed Tdap: 01/16/18  
Pneumovax: not yet needed Shingrix: not yet needed Flu shot: 9/18 Eye exam: Dr. Brenda Ch, 8/17 Lipids: 1/18 LDL 91 Patient Active Problem List  
 Diagnosis Date Noted  Anxiety and depression 01/16/2018  Mixed anxiety and depressive disorder 04/04/2016 Current Outpatient Medications Medication Sig Dispense Refill  cyclobenzaprine (FLEXERIL) 10 mg tablet Take 1 Tab by mouth three (3) times daily as needed for Muscle Spasm(s). 30 Tab 0  
 ergocalciferol (ERGOCALCIFEROL) 50,000 unit capsule Take 1 Cap by mouth every seven (7) days.  8 Cap 0  
  venlafaxine-SR (EFFEXOR-XR) 37.5 mg capsule Take 1 Cap by mouth daily. 30 Cap 3 Past Surgical History:  
Procedure Laterality Date  HX BREAST REDUCTION Bilateral 10/02/2018  HX  SECTION    
 HX TUBAL LIGATION Bilateral 2015  HX WISDOM TEETH EXTRACTION Lab Results Component Value Date/Time WBC 7.7 2018 12:44 PM  
 HGB 11.7 2018 12:44 PM  
 HCT 34.5 2018 12:44 PM  
 PLATELET 511 63/10/6716 12:44 PM  
 MCV 80 2018 12:44 PM  
 
Lab Results Component Value Date/Time Cholesterol, total 159 2018 10:24 AM  
 HDL Cholesterol 57 2018 10:24 AM  
 LDL, calculated 91 2018 10:24 AM  
 Triglyceride 55 2018 10:24 AM  
 
Lab Results Component Value Date/Time GFR est non- 2018 12:44 PM  
 GFR est  2018 12:44 PM  
 Creatinine 0.78 2018 12:44 PM  
 BUN 10 2018 12:44 PM  
 Sodium 145 (H) 2018 12:44 PM  
 Potassium 3.8 2018 12:44 PM  
 Chloride 104 2018 12:44 PM  
 CO2 26 2018 12:44 PM  
  
Review of Systems Respiratory: Negative for shortness of breath. Cardiovascular: Negative for chest pain. Physical Exam  
Constitutional: She is oriented to person, place, and time. She appears well-developed and well-nourished. No distress. HENT:  
Head: Normocephalic and atraumatic. Mouth/Throat: Oropharynx is clear and moist. No oropharyngeal exudate. Eyes: Conjunctivae and EOM are normal. Right eye exhibits no discharge. Left eye exhibits no discharge. Neck: Normal range of motion. Neck supple. Cardiovascular: Normal rate, regular rhythm and normal heart sounds. Exam reveals no gallop and no friction rub. No murmur heard. Pulmonary/Chest: Effort normal and breath sounds normal. No respiratory distress. She has no wheezes. She has no rales. She exhibits no tenderness. Musculoskeletal: Normal range of motion. She exhibits no edema, tenderness or deformity. Lymphadenopathy:  
  She has no cervical adenopathy. Neurological: She is alert and oriented to person, place, and time. Coordination normal.  
Skin: Skin is warm and dry. No rash noted. She is not diaphoretic. No erythema. No pallor. Psychiatric: She has a normal mood and affect. Her behavior is normal.  
 
 
ASSESSMENT and PLAN 
  ICD-10-CM ICD-9-CM 1. Anxiety and depression 
 
effexor caused HAs, well did not help, she feels that her sx are improved and is not interested in additional meds, discussed options, if her sx return would give trial buspar F41.9 300.00   
 F32.9 311   
2. S/P bilateral breast reduction Healing well, still feeling achey from surgery, has f/u with surgeon next week Z98.890 V45.89   
3. Vitamin D deficiency Start 50K U weekly, already ordered E55.9 268.9 4. Primary insomnia Give trial trazadone F51.01 307.42 Scribed by Nanci Franklin of [address], as dictated by Dr. Nolan Rendon. Current diagnosis and concerns discussed with pt at length. Pt understands risks and benefits or current treatment plan and medications, and accepts the treatment and medication with any possible risks. Pt asks appropriate questions, which were answered. Pt was instructed to call with any concerns or problems. I have reviewed the note documented by the scribe. The services provided are my own. The documentation is accurate This note will not be viewable in [address]

## 2019-01-02 ENCOUNTER — TELEPHONE (OUTPATIENT)
Dept: INTERNAL MEDICINE CLINIC | Age: 34
End: 2019-01-02

## 2019-01-02 NOTE — TELEPHONE ENCOUNTER
Regarding: Non-Urgent Medical Question  Contact: [phone]  ----- Message from [address] [place] [address] [address], Generic sent at 1/2/2019  8:29 AM EST -----    Good Morning I was just checking to see if there were any appointment available this week to see Dr. Rhoda Lyons

## 2019-01-02 NOTE — TELEPHONE ENCOUNTER
Called, spoke to pt. Two pt identifiers confirmed. Pt states losing her sister on new years lynda and seeking an appt. Pt offered and accepted appt for 1/3/19 1000. Pt verbalized understanding of information discussed w/ no further questions at this time.

## 2019-01-03 ENCOUNTER — OFFICE VISIT (OUTPATIENT)
Dept: INTERNAL MEDICINE CLINIC | Age: 34
End: 2019-01-03

## 2019-01-03 ENCOUNTER — DOCUMENTATION ONLY (OUTPATIENT)
Dept: INTERNAL MEDICINE CLINIC | Age: 34
End: 2019-01-03

## 2019-01-03 VITALS
BODY MASS INDEX: 30.53 KG/M2 | HEART RATE: 84 BPM | TEMPERATURE: 97.7 F | SYSTOLIC BLOOD PRESSURE: 113 MMHG | RESPIRATION RATE: 16 BRPM | WEIGHT: 190 LBS | DIASTOLIC BLOOD PRESSURE: 78 MMHG | OXYGEN SATURATION: 98 % | HEIGHT: 66 IN

## 2019-01-03 DIAGNOSIS — F51.01 PRIMARY INSOMNIA: ICD-10-CM

## 2019-01-03 DIAGNOSIS — F32.A ANXIETY AND DEPRESSION: Primary | ICD-10-CM

## 2019-01-03 DIAGNOSIS — F41.9 ANXIETY AND DEPRESSION: Primary | ICD-10-CM

## 2019-01-03 RX ORDER — ESCITALOPRAM OXALATE 5 MG/1
5 TABLET ORAL DAILY
Qty: 30 TAB | Refills: 3 | Status: SHIPPED | OUTPATIENT
Start: 2019-01-03 | End: 2019-01-28 | Stop reason: DRUGHIGH

## 2019-01-03 RX ORDER — LORAZEPAM 0.5 MG/1
0.5 TABLET ORAL
Qty: 15 TAB | Refills: 0 | Status: SHIPPED | OUTPATIENT
Start: 2019-01-03 | End: 2019-01-28 | Stop reason: SDUPTHER

## 2019-01-03 NOTE — LETTER
NOTIFICATION RETURN TO WORK / SCHOOL 
 
1/3/2019 10:22 AM 
 
Ms. Del Avery [phone] To Whom It May Concern: 
 
Del Avery is currently under the care of [facility]. She will return to work/school on: 1/17/19 If there are questions or concerns please have the patient contact our office.  
 
 
 
Sincerely, 
 
 
Heather Street MD

## 2019-01-03 NOTE — PROGRESS NOTES
The following prescription was faxed into pt's [facility] pharmacy w/ confirmation received:      escitalopram oxalate (LEXAPRO) 5 mg tablet [[id number]]   Order Details   Dose: 5 mg Route: Oral Frequency: DAILY   Dispense Quantity: 30 Tab Refills: 3 Fills remaining: --           Sig: Take 1 Tab by mouth daily.          Written Date: 01/03/19 Expiration Date: --     Start Date: 01/03/19 End Date: --            Ordering Provider:  -- LYDIA #:  -- NPI:  --    Authorizing Provider:  Taran Man MD LYDIA #:  [id number] NPI:  [id number]    Ordering User:  Taran Man MD               Pharmacy:  [facility], [address] [place] [address] AT [employer] [facility] #:  [id number]     Pharmacy Comments:  --          Fill quantity remaining:  -- Fill quantity used:  --            LORazepam (ATIVAN) 0.5 mg tablet [[id number]]   Order Details   Dose: 0.5 mg Route: Oral Frequency: BEDTIME PRN for Anxiety   Dispense Quantity: 15 Tab Refills: 0 Fills remaining: --           Sig: Take 1 Tab by mouth nightly as needed for Anxiety.  Max Daily Amount: 0.5 mg.          Written Date: 01/03/19 Expiration Date: --     Start Date: 01/03/19 End Date: --            Ordering Provider:  -- LYDIA #:  -- NPI:  --    Authorizing Provider:  Taran Man MD LYDIA #:  [id number] NPI:  [id number]    Ordering User:  Taran Man MD            Diagnosis Association: Anxiety and depression (F41.9 , F32.9)      Pharmacy:  [facility] [address] [facility] [address] #:  [id number]     Pharmacy Comments:  --          Fill quantity remaining:  -- Fill quantity used:  --        Priority and Order Details     Priority Class    Routine Print

## 2019-01-03 NOTE — PROGRESS NOTES
HISTORY OF PRESENT ILLNESS  Zoë Matthews is a 35 y.o. female. HPI   Last here 10/19/18. Pt is here for acute care.     Pt states that her sister was murdered on [place] [address]  She had to go to her sister's apartment today where there was evidence of what happened, so she is feeling especially emotional today  Pt is having trouble sleeping - she has been using trazadone but it has not been keeping her asleep, she wakes up in the middle of the night  She is also not ready to go back to work - will give work note for 2 weeks  Pt reports that prior to this incident, she had been managing her anxiety/depression  She is now feeling overwhelmed, with what happened added to her own struggles  Pt is currently establishing with a counselor  Will start lexapro  Will give limited amount of ativan to use to help her sleep at night  Recall zoloft helped but caused decreased libido, failed wellbutrin and effexor    Patient Active Problem List    Diagnosis Date Noted    Anxiety and depression 2018    Mixed anxiety and depressive disorder 2016     Current Outpatient Medications   Medication Sig Dispense Refill    traZODone (DESYREL) 50 mg tablet Take 1 Tab by mouth nightly. 30 Tab 2    ergocalciferol (ERGOCALCIFEROL) 50,000 unit capsule Take 1 Cap by mouth every seven (7) days. 8 Cap 0    cyclobenzaprine (FLEXERIL) 10 mg tablet Take 1 Tab by mouth three (3) times daily as needed for Muscle Spasm(s).  30 Tab 0     Past Surgical History:   Procedure Laterality Date    HX BREAST REDUCTION Bilateral 10/02/2018    HX  SECTION      HX TUBAL LIGATION Bilateral 2015    HX WISDOM TEETH EXTRACTION        Lab Results   Component Value Date/Time    WBC 7.7 2018 12:44 PM    HGB 11.7 2018 12:44 PM    HCT 34.5 2018 12:44 PM    PLATELET 865  12:44 PM    MCV 80 2018 12:44 PM     Lab Results   Component Value Date/Time    Cholesterol, total 159 2018 10:24 AM    HDL Cholesterol 57 01/16/2018 10:24 AM    LDL, calculated 91 01/16/2018 10:24 AM    Triglyceride 55 01/16/2018 10:24 AM     Lab Results   Component Value Date/Time    GFR est non- 09/04/2018 12:44 PM    GFR est  09/04/2018 12:44 PM    Creatinine 0.78 09/04/2018 12:44 PM    BUN 10 09/04/2018 12:44 PM    Sodium 145 (H) 09/04/2018 12:44 PM    Potassium 3.8 09/04/2018 12:44 PM    Chloride 104 09/04/2018 12:44 PM    CO2 26 09/04/2018 12:44 PM        Review of Systems   Respiratory: Negative for shortness of breath. Cardiovascular: Negative for chest pain. Physical Exam   Constitutional: She is oriented to person, place, and time. She appears well-developed and well-nourished. No distress. HENT:   Head: Normocephalic and atraumatic. Mouth/Throat: Oropharynx is clear and moist. No oropharyngeal exudate. Eyes: Conjunctivae and EOM are normal. Right eye exhibits no discharge. Left eye exhibits no discharge. Neck: Normal range of motion. Neck supple. Cardiovascular: Normal rate, regular rhythm and normal heart sounds. Exam reveals no gallop and no friction rub. No murmur heard. Pulmonary/Chest: Effort normal and breath sounds normal. No respiratory distress. She has no wheezes. She has no rales. She exhibits no tenderness. Musculoskeletal: Normal range of motion. She exhibits no edema, tenderness or deformity. Lymphadenopathy:     She has no cervical adenopathy. Neurological: She is alert and oriented to person, place, and time. Coordination normal.   Skin: Skin is warm and dry. No rash noted. She is not diaphoretic. No erythema. No pallor. Psychiatric: She has a normal mood and affect. Her behavior is normal.       ASSESSMENT and PLAN    ICD-10-CM ICD-9-CM    1. Anxiety and depression    Progressive since death of her sister, will start lexapro 5mg daily, ativan prn qhs for acute stress, limited amount provided   F41.9 300.00 LORazepam (ATIVAN) 0.5 mg tablet    F32.9 311    2.  Primary insomnia    Continue trazadone, ativan will help as well    Gave 2 weeks off of work d/t grief, will complete FMLA if needed   F51.01 307.42         Scribed by Latasha Silverio of [facility], as dictated by Dr. Collette Dent. Current diagnosis and concerns discussed with pt at length. Pt understands risks and benefits or current treatment plan and medications, and accepts the treatment and medication with any possible risks. Pt asks appropriate questions, which were answered. Pt was instructed to call with any concerns or problems. I have reviewed the note documented by the scribe. The services provided are my own.   The documentation is accurate

## 2019-01-28 ENCOUNTER — OFFICE VISIT (OUTPATIENT)
Dept: INTERNAL MEDICINE CLINIC | Age: 34
End: 2019-01-28

## 2019-01-28 VITALS
HEART RATE: 76 BPM | HEIGHT: 66 IN | WEIGHT: 192 LBS | OXYGEN SATURATION: 99 % | DIASTOLIC BLOOD PRESSURE: 86 MMHG | BODY MASS INDEX: 30.86 KG/M2 | RESPIRATION RATE: 16 BRPM | TEMPERATURE: 98.2 F | SYSTOLIC BLOOD PRESSURE: 127 MMHG

## 2019-01-28 DIAGNOSIS — Z00.00 PHYSICAL EXAM, ANNUAL: Primary | ICD-10-CM

## 2019-01-28 DIAGNOSIS — F32.A ANXIETY AND DEPRESSION: ICD-10-CM

## 2019-01-28 DIAGNOSIS — F51.01 PRIMARY INSOMNIA: ICD-10-CM

## 2019-01-28 DIAGNOSIS — E55.9 VITAMIN D DEFICIENCY: ICD-10-CM

## 2019-01-28 DIAGNOSIS — F41.9 ANXIETY AND DEPRESSION: ICD-10-CM

## 2019-01-28 RX ORDER — LORAZEPAM 0.5 MG/1
0.5 TABLET ORAL
Qty: 15 TAB | Refills: 0 | Status: SHIPPED | OUTPATIENT
Start: 2019-01-28 | End: 2019-11-04 | Stop reason: SDUPTHER

## 2019-01-28 RX ORDER — ESCITALOPRAM OXALATE 10 MG/1
10 TABLET ORAL DAILY
Qty: 30 TAB | Refills: 3 | Status: SHIPPED | OUTPATIENT
Start: 2019-01-28 | End: 2019-04-24

## 2019-01-28 NOTE — PROGRESS NOTES
HISTORY OF PRESENT ILLNESS Latasha Rankin is a 35 y.o. female. HPI Last here 1/3/19. Pt is here for acute/routine care. 
  
Lov, pt discussed difficulties after her sister was murdered on [place] [address] Lov, started lexapro - she has not felt anything different yet, but is not having any SE Pt is doing grief counseling - she just started with a new one and just had first session last week - next visit scheduled for 2/7/19 Pt would like FMLA paperwork done - will complete this today for intermittent leave Pt states that last week was difficult, but today she is doing a bit better Lov, provided limited amount of ativan - it does not help too much for sleep however, does not make her sleepy Pt previously tried trazadone but this did not help Pt can go to sleep but not stay asleep - however she is not looking for any different medications Discussed trying benadryl or melatonin OTC Will increase lexapro to 10mg Recall zoloft helped but caused decreased libido, failed wellbutrin and effexor 
  
BP is 127/86 
   
Wt today is 192 lbs --up 2 lbs x lov Her wt is still down from ~200 last fall, about even from a year ago Discussed monitoring portions Pt states that she is going to be starting going to the gym, to help with her emotions as well Discussed diet and weight loss  
   
Will get labs today Pt had breast reduction surg 10/2/18 Pt has finished seeing the surgeon Discussed that her scars will likely continue to thin down over time and become less tender Pt wonders if this is a keloid, discussed it may be a tiny but, but is also just an extensive scar that should improve Pt has not been taking her vit D 50K U recently She has this at home - advised her to start taking it 
   
PREVENTIVE: 
Colonoscopy: not yet needed Pap: Dr. Niles Laird, 4/18, annually Mammogram: not yet needed Dexa: not yet needed Tdap: 01/16/18  
Pneumovax: not yet needed Shingrix: not yet needed Flu GAWK: 7/18 Eye exam: Dr. Michaela Shultz, summer 2018, will repeat in 2 years Lipids:  LDL 91 Patient Active Problem List  
 Diagnosis Date Noted  Anxiety and depression 2018  Mixed anxiety and depressive disorder 2016 Current Outpatient Medications Medication Sig Dispense Refill  escitalopram oxalate (LEXAPRO) 5 mg tablet Take 1 Tab by mouth daily. 30 Tab 3  
 LORazepam (ATIVAN) 0.5 mg tablet Take 1 Tab by mouth nightly as needed for Anxiety. Max Daily Amount: 0.5 mg. 15 Tab 0  
 traZODone (DESYREL) 50 mg tablet Take 1 Tab by mouth nightly. 30 Tab 2  
 ergocalciferol (ERGOCALCIFEROL) 50,000 unit capsule Take 1 Cap by mouth every seven (7) days. 8 Cap 0  cyclobenzaprine (FLEXERIL) 10 mg tablet Take 1 Tab by mouth three (3) times daily as needed for Muscle Spasm(s). 30 Tab 0 Past Surgical History:  
Procedure Laterality Date  HX BREAST REDUCTION Bilateral 10/02/2018  HX  SECTION    
 HX TUBAL LIGATION Bilateral 2015  HX WISDOM TEETH EXTRACTION Lab Results Component Value Date/Time WBC 7.7 2018 12:44 PM  
 HGB 11.7 2018 12:44 PM  
 HCT 34.5 2018 12:44 PM  
 PLATELET 046  12:44 PM  
 MCV 80 2018 12:44 PM  
 
Lab Results Component Value Date/Time Cholesterol, total 159 2018 10:24 AM  
 HDL Cholesterol 57 2018 10:24 AM  
 LDL, calculated 91 2018 10:24 AM  
 Triglyceride 55 2018 10:24 AM  
 
Lab Results Component Value Date/Time GFR est non- 2018 12:44 PM  
 GFR est  2018 12:44 PM  
 Creatinine 0.78 2018 12:44 PM  
 BUN 10 2018 12:44 PM  
 Sodium 145 (H) 2018 12:44 PM  
 Potassium 3.8 2018 12:44 PM  
 Chloride 104 2018 12:44 PM  
 CO2 26 2018 12:44 PM  
  
Review of Systems Respiratory: Negative for shortness of breath. Cardiovascular: Negative for chest pain. Psychiatric/Behavioral: Positive for depression.  The patient is nervous/anxious and has insomnia. Physical Exam  
Constitutional: She is oriented to person, place, and time. She appears well-developed and well-nourished. No distress. HENT:  
Head: Normocephalic and atraumatic. Right Ear: External ear normal.  
Left Ear: External ear normal.  
Mouth/Throat: Oropharynx is clear and moist. No oropharyngeal exudate. Eyes: Conjunctivae and EOM are normal. Right eye exhibits no discharge. Left eye exhibits no discharge. Neck: Normal range of motion. Neck supple. Cardiovascular: Normal rate, regular rhythm and normal heart sounds. Exam reveals no gallop and no friction rub. No murmur heard. Pulmonary/Chest: Effort normal and breath sounds normal. No respiratory distress. She has no wheezes. She has no rales. She exhibits no tenderness. Abdominal: Soft. She exhibits no distension and no mass. There is no tenderness. There is no rebound and no guarding. Musculoskeletal: Normal range of motion. She exhibits no edema, tenderness or deformity. Lymphadenopathy:  
  She has no cervical adenopathy. Neurological: She is alert and oriented to person, place, and time. Coordination normal.  
Skin: Skin is warm and dry. No rash noted. She is not diaphoretic. No erythema. No pallor. Well healed surgical scars Psychiatric: She has a normal mood and affect. Her behavior is normal.  
 
 
ASSESSMENT and PLAN 
  ICD-10-CM ICD-9-CM 1. Anxiety and depression Overall much improved since lov, pt is struggling from grief related to her sister's death, will increase lexapro to 10mg and refill ativan for infrequent use, have also filled out [facility] paperwork for 1 day per week to attend her counseling with her grief counselor, completed this for a year F41.9 300.00 LORazepam (ATIVAN) 0.5 mg tablet F32.9 311 2. Primary insomnia Discussed benadryl or melatonin OTC, should improve as depression and anxiety improved F51.01 307.42   
3.  Physical exam, annual 
 
 Discussed diet and w/l, BP nl, pelvic is due in 4/19, eye exam will be due in 2 years, mammo not yet needed yet, COLO not needed yet, flu shot UTD, labs ordered Z00.00 V70.0 LIPID PANEL  
   METABOLIC PANEL, COMPREHENSIVE  
   CBC W/O DIFF  
   TSH 3RD GENERATION  
   VITAMIN D, 25 HYDROXY  
   HEMOGLOBIN A1C WITH EAG 4. Vitamin D deficiency Has not taken the 50K U weekly, has it at home however, encouraged her to go ahead and start taking it, she states she will E55.9 268.9 Scribed by Anuel Hernandez of [facility], as dictated by Dr. Libertad Goldstein. Current diagnosis and concerns discussed with pt at length. Pt understands risks and benefits or current treatment plan and medications, and accepts the treatment and medication with any possible risks. Pt asks appropriate questions, which were answered. Pt was instructed to call with any concerns or problems. I have reviewed the note documented by the scribe. The services provided are my own. The documentation is accurate

## 2019-01-28 NOTE — PROGRESS NOTES
Following rx was faxed to pharmacy with confirmation received: LORazepam (ATIVAN) 0.5 mg tablet [[id number]] Order Details Dose: 0.5 mg Route: Oral Frequency: BEDTIME PRN for Anxiety Dispense Quantity: 15 Tab Refills: 0 Fills remaining: --   
      
Sig: Take 1 Tab by mouth nightly as needed for Anxiety.  Max Daily Amount: 0.5 mg.  
      
Written Date: 01/28/19 Expiration Date: --    
Start Date: 01/28/19 End Date: --

## 2019-01-29 ENCOUNTER — PATIENT MESSAGE (OUTPATIENT)
Dept: INTERNAL MEDICINE CLINIC | Age: 34
End: 2019-01-29

## 2019-01-29 LAB
25(OH)D3+25(OH)D2 SERPL-MCNC: 9.4 NG/ML (ref 30–100)
ALBUMIN SERPL-MCNC: 4.6 G/DL (ref 3.5–5.5)
ALBUMIN/GLOB SERPL: 1.8 {RATIO} (ref 1.2–2.2)
ALP SERPL-CCNC: 45 IU/L (ref 39–117)
ALT SERPL-CCNC: 17 IU/L (ref 0–32)
AST SERPL-CCNC: 13 IU/L (ref 0–40)
BILIRUB SERPL-MCNC: 0.2 MG/DL (ref 0–1.2)
BUN SERPL-MCNC: 8 MG/DL (ref 6–20)
BUN/CREAT SERPL: 10 (ref 9–23)
CALCIUM SERPL-MCNC: 9.7 MG/DL (ref 8.7–10.2)
CHLORIDE SERPL-SCNC: 104 MMOL/L (ref 96–106)
CHOLEST SERPL-MCNC: 172 MG/DL (ref 100–199)
CO2 SERPL-SCNC: 24 MMOL/L (ref 20–29)
CREAT SERPL-MCNC: 0.79 MG/DL (ref 0.57–1)
ERYTHROCYTE [DISTWIDTH] IN BLOOD BY AUTOMATED COUNT: 15.2 % (ref 12.3–15.4)
EST. AVERAGE GLUCOSE BLD GHB EST-MCNC: 111 MG/DL
GLOBULIN SER CALC-MCNC: 2.5 G/DL (ref 1.5–4.5)
GLUCOSE SERPL-MCNC: 89 MG/DL (ref 65–99)
HBA1C MFR BLD: 5.5 % (ref 4.8–5.6)
HCT VFR BLD AUTO: 36.2 % (ref 34–46.6)
HDLC SERPL-MCNC: 54 MG/DL
HGB BLD-MCNC: 11.9 G/DL (ref 11.1–15.9)
LDLC SERPL CALC-MCNC: 106 MG/DL (ref 0–99)
MCH RBC QN AUTO: 26.2 PG (ref 26.6–33)
MCHC RBC AUTO-ENTMCNC: 32.9 G/DL (ref 31.5–35.7)
MCV RBC AUTO: 80 FL (ref 79–97)
PLATELET # BLD AUTO: 300 X10E3/UL (ref 150–379)
POTASSIUM SERPL-SCNC: 4.2 MMOL/L (ref 3.5–5.2)
PROT SERPL-MCNC: 7.1 G/DL (ref 6–8.5)
RBC # BLD AUTO: 4.54 X10E6/UL (ref 3.77–5.28)
SODIUM SERPL-SCNC: 143 MMOL/L (ref 134–144)
TRIGL SERPL-MCNC: 62 MG/DL (ref 0–149)
TSH SERPL DL<=0.005 MIU/L-ACNC: 1.31 UIU/ML (ref 0.45–4.5)
VLDLC SERPL CALC-MCNC: 12 MG/DL (ref 5–40)
WBC # BLD AUTO: 6.7 X10E3/UL (ref 3.4–10.8)

## 2019-01-29 RX ORDER — ERGOCALCIFEROL 1.25 MG/1
50000 CAPSULE ORAL
Qty: 16 CAP | Refills: 0 | Status: SHIPPED | OUTPATIENT
Start: 2019-01-29 | End: 2019-04-24

## 2019-01-29 NOTE — PROGRESS NOTES
Called, spoke with Pt. Two pt identifiers confirmed. Pt informed per Dr. Angelica Mcghee vit D is low. Pt informed per Dr. Angelica Mcghee patient needs to take 50,000 units weekly for 16 weeks then start a daily 2000unit supplement instead. - pended Pt request information be relayed through [employer] as well so she would not forget. Pt informed rest of labs stable. Pt verbalized understanding of information discussed w/ no further questions at this time.

## 2019-01-29 NOTE — PROGRESS NOTES
vit D is low--the patient needs 50,000 units weekly for 16 weeks then start a daily 2000unit supplement instead.  
 
Rest good

## 2019-02-17 ENCOUNTER — OFFICE VISIT (OUTPATIENT)
Dept: URGENT CARE | Age: 34
End: 2019-02-17

## 2019-02-17 VITALS
OXYGEN SATURATION: 98 % | SYSTOLIC BLOOD PRESSURE: 127 MMHG | HEART RATE: 100 BPM | TEMPERATURE: 98.8 F | DIASTOLIC BLOOD PRESSURE: 83 MMHG | WEIGHT: 192 LBS | RESPIRATION RATE: 18 BRPM | BODY MASS INDEX: 30.86 KG/M2 | HEIGHT: 66 IN

## 2019-02-17 DIAGNOSIS — R05.9 COUGH: ICD-10-CM

## 2019-02-17 DIAGNOSIS — J06.9 VIRAL UPPER RESPIRATORY ILLNESS: Primary | ICD-10-CM

## 2019-02-17 LAB
FLUAV+FLUBV AG NOSE QL IA.RAPID: NEGATIVE POS/NEG
FLUAV+FLUBV AG NOSE QL IA.RAPID: NEGATIVE POS/NEG
VALID INTERNAL CONTROL?: YES

## 2019-02-17 RX ORDER — BENZONATATE 200 MG/1
200 CAPSULE ORAL
Qty: 21 CAP | Refills: 0 | Status: SHIPPED | OUTPATIENT
Start: 2019-02-17 | End: 2019-02-24

## 2019-02-17 NOTE — PATIENT INSTRUCTIONS
Follow up with PCP without improvement over next 5-7 days sooner/immediately for new or worsening       Viral Respiratory Infection: Care Instructions  Your Care Instructions    Viruses are very small organisms. They grow in number after they enter your body. There are many types that cause different illnesses, such as colds and the mumps. The symptoms of a viral respiratory infection often start quickly. They include a fever, sore throat, and runny nose. You may also just not feel well. Or you may not want to eat much. Most viral respiratory infections are not serious. They usually get better with time and self-care. Antibiotics are not used to treat a viral infection. That's because antibiotics will not help cure a viral illness. In some cases, antiviral medicine can help your body fight a serious viral infection. Follow-up care is a key part of your treatment and safety. Be sure to make and go to all appointments, and call your doctor if you are having problems. It's also a good idea to know your test results and keep a list of the medicines you take. How can you care for yourself at home? · Rest as much as possible until you feel better. · Be safe with medicines. Take your medicine exactly as prescribed. Call your doctor if you think you are having a problem with your medicine. You will get more details on the specific medicine your doctor prescribes. · Take an over-the-counter pain medicine, such as acetaminophen (Tylenol), ibuprofen (Advil, Motrin), or naproxen (Aleve), as needed for pain and fever. Read and follow all instructions on the label. Do not give aspirin to anyone younger than 20. It has been linked to Reye syndrome, a serious illness. · Drink plenty of fluids, enough so that your urine is light yellow or clear like water. Hot fluids, such as tea or soup, may help relieve congestion in your nose and throat.  If you have kidney, heart, or liver disease and have to limit fluids, talk with your doctor before you increase the amount of fluids you drink. · Try to clear mucus from your lungs by breathing deeply and coughing. · Gargle with warm salt water once an hour. This can help reduce swelling and throat pain. Use 1 teaspoon of salt mixed in 1 cup of warm water. · Do not smoke or allow others to smoke around you. If you need help quitting, talk to your doctor about stop-smoking programs and medicines. These can increase your chances of quitting for good. To avoid spreading the virus  · Cough or sneeze into a tissue. Then throw the tissue away. · If you don't have a tissue, use your hand to cover your cough or sneeze. Then clean your hand. You can also cough into your sleeve. · Wash your hands often. Use soap and warm water. Wash for 15 to 20 seconds each time. · If you don't have soap and water near you, you can clean your hands with alcohol wipes or gel. When should you call for help? Call your doctor now or seek immediate medical care if:    · You have a new or higher fever.     · Your fever lasts more than 48 hours.     · You have trouble breathing.     · You have a fever with a stiff neck or a severe headache.     · You are sensitive to light.     · You feel very sleepy or confused.    Watch closely for changes in your health, and be sure to contact your doctor if:    · You do not get better as expected. Where can you learn more? Go to http://janette-michelle.info/. Enter T979 in the search box to learn more about \"Viral Respiratory Infection: Care Instructions. \"  Current as of: September 5, 2018  Content Version: 11.9  © 8044-5130 [employer]. Care instructions adapted under license by [employer] (which disclaims liability or warranty for this information).  If you have questions about a medical condition or this instruction, always ask your healthcare professional. [address] any warranty or liability for your use of this information.

## 2019-02-17 NOTE — PROGRESS NOTES
Zacarias Ortega is a 35 y.o. female who present complaining of cold-like symptoms: dry cough, nasal congestion, runny nose. Symptom onset 2-3 days ago without any preceding illness. Has tried OTC meds without resolution. No aggravating or alleviating factors. Symptoms are constant and overall worsening. Promotes no decrease in PO intake of fluids. Specifically denies: severe lethargy, SOB, syncope/near syncope, vomiting/diarrhea, chest pain, chest pain with breathing, labored breathing, severe headache, non-intractable . Hx significant for:   has a past medical history of Heart murmur, History of , and Preeclampsia. Past Medical History:   Diagnosis Date    Heart murmur     History of      Preeclampsia         Past Surgical History:   Procedure Laterality Date    HX BREAST REDUCTION Bilateral 10/02/2018    HX  SECTION      HX TUBAL LIGATION Bilateral 2015    HX WISDOM TEETH EXTRACTION           Family History   Problem Relation Age of Onset    Hypertension Mother     Diabetes Father     Arthritis-osteo Maternal Aunt     Stroke Maternal Grandmother         Social History     Socioeconomic History    Marital status:      Spouse name: Not on file    Number of children: Not on file    Years of education: Not on file    Highest education level: Not on file   Social Needs    Financial resource strain: Not on file    Food insecurity - worry: Not on file    Food insecurity - inability: Not on file   [employer] needs - medical: Not on file   [employer] needs - non-medical: Not on file   Occupational History    Not on file   Tobacco Use    Smoking status: Never Smoker    Smokeless tobacco: Never Used   Substance and Sexual Activity    Alcohol use:  Yes     Alcohol/week: 0.0 oz     Types: 1 - 2 Standard drinks or equivalent per week     Comment: RARE    Drug use: No    Sexual activity: Yes     Partners: Male     Birth control/protection: None, Pill, Surgical     Comment:    Other Topics Concern    Not on file   Social History Narrative    Not on file                ALLERGIES: Percocet [oxycodone-acetaminophen] and Prednisone    Review of Systems   All other systems reviewed and are negative. Vitals:    02/17/19 1254   BP: 127/83   Pulse: 100   Resp: 18   Temp: 98.8 °F (37.1 °C)   SpO2: 98%   Weight: 192 lb (87.1 kg)   Height: 5' 6\" (1.676 m)       Physical Exam   Constitutional: She is oriented to person, place, and time. No distress. Non-toxic appearing, well hydrated   HENT:   Mouth/Throat: No oropharyngeal exudate. TMs normal appearing bilat  Erythematous nasal turbinates with clear rhinorrhea  OP mild erythema without swelling or exudate. Uvula midline. No oral lesions. Eyes: Conjunctivae and EOM are normal. Pupils are equal, round, and reactive to light. No scleral icterus. Neck: Normal range of motion. Neck supple. Cardiovascular: Normal rate, regular rhythm and normal heart sounds. Exam reveals no gallop and no friction rub. No murmur heard. Pulmonary/Chest: Effort normal and breath sounds normal. No respiratory distress. She has no wheezes. She has no rales. Lymphadenopathy:     She has no cervical adenopathy. Neurological: She is alert and oriented to person, place, and time. No cranial nerve deficit. Skin: Skin is warm and dry. No rash noted. She is not diaphoretic. No erythema. No pallor. Psychiatric: She has a normal mood and affect. Her behavior is normal. Thought content normal.   Nursing note and vitals reviewed. [facility]     Differential Diagnosis; Clinical Impression; Plan:         CLINICAL IMPRESSION:  (J06.9) Viral upper respiratory illness  (primary encounter diagnosis)  (R05) Cough    Orders Placed This Encounter      benzonatate (TESSALON) 200 mg capsule          Sig: Take 1 Cap by mouth three (3) times daily as needed for Cough for up to 7 days.           Dispense:  21 Cap          Refill:  0      oxymetazoline (AFRIN NO DRIP,OXYMETAZOLIN,) 0.05 % mist          Si-2 sprays each nostril 2 times daily for no more than 3 days. Dispense:  14.7 mL          Refill:  0      Plan:  Flu negative  Viral uri without complication  Fluids, rest  See above orders  Review handouts    We have reviewed concerning signs/symptoms, normal vs abnormal progression of medical condition and when to seek immediate medical attention. Schedule with PCP or Urgent Care immediately for worsening or new symptoms. See your PCP if there is not at least some improvement in symptoms within the next 1 week  You should see your PCP for updates on your routine health maintenance.            Results for orders placed or performed in visit on 19   AMB POC CLAUDIA INFLUENZA A/B TEST   Result Value Ref Range    VALID INTERNAL CONTROL POC Yes     Influenza A Ag POC Negative Negative Pos/Neg    Influenza B Ag POC Negative Negative Pos/Neg         Procedures

## 2019-02-17 NOTE — LETTER
NOTIFICATION RETURN TO WORK / SCHOOL 
 
2/17/2019 1:33 PM 
 
Ms. Nevaeh Blanco [facility] 7 [id number] To Whom It May Concern: 
 
Nevaeh Blanco is currently under the care of [address] [place] [address]. She will return to work/school on: 02/19/2019 If there are questions or concerns please have the patient contact our office. Sincerely, E PROVIDER

## 2019-04-24 ENCOUNTER — TELEPHONE (OUTPATIENT)
Dept: INTERNAL MEDICINE CLINIC | Age: 34
End: 2019-04-24

## 2019-04-24 ENCOUNTER — OFFICE VISIT (OUTPATIENT)
Dept: INTERNAL MEDICINE CLINIC | Age: 34
End: 2019-04-24

## 2019-04-24 VITALS
OXYGEN SATURATION: 97 % | DIASTOLIC BLOOD PRESSURE: 68 MMHG | HEIGHT: 66 IN | RESPIRATION RATE: 16 BRPM | WEIGHT: 184 LBS | BODY MASS INDEX: 29.57 KG/M2 | SYSTOLIC BLOOD PRESSURE: 108 MMHG | HEART RATE: 85 BPM | TEMPERATURE: 98 F

## 2019-04-24 DIAGNOSIS — F32.A ANXIETY AND DEPRESSION: Primary | ICD-10-CM

## 2019-04-24 DIAGNOSIS — F41.9 ANXIETY AND DEPRESSION: Primary | ICD-10-CM

## 2019-04-24 DIAGNOSIS — E66.9 OBESITY (BMI 30.0-34.9): ICD-10-CM

## 2019-04-24 DIAGNOSIS — E55.9 VITAMIN D DEFICIENCY: ICD-10-CM

## 2019-04-24 DIAGNOSIS — F51.01 PRIMARY INSOMNIA: ICD-10-CM

## 2019-04-24 RX ORDER — ESCITALOPRAM OXALATE 20 MG/1
20 TABLET ORAL DAILY
Qty: 30 TAB | Refills: 3 | Status: SHIPPED | OUTPATIENT
Start: 2019-04-24 | End: 2019-11-04

## 2019-04-24 RX ORDER — AMITRIPTYLINE HYDROCHLORIDE 25 MG/1
25 TABLET, FILM COATED ORAL
Qty: 30 TAB | Refills: 1 | Status: SHIPPED | OUTPATIENT
Start: 2019-04-24 | End: 2019-11-04

## 2019-04-24 RX ORDER — TRETINOIN 0.25 MG/G
CREAM TOPICAL
COMMUNITY
Start: 2019-04-16

## 2019-04-24 RX ORDER — SPIRONOLACTONE 50 MG/1
TABLET, FILM COATED ORAL
COMMUNITY
Start: 2019-04-16 | End: 2019-11-04

## 2019-04-24 NOTE — TELEPHONE ENCOUNTER
Regarding: Visit Follow-Up Question  Contact: [phone]  ----- Message from [address] [place] [address] [address], Generic sent at 4/24/2019  7:52 AM EDT -----    Good Morning,    Does Dr. Marino Navarro have any openings? I need to come in and discuss my medications. [place] [address] [address] [employer]. So it's like a 5 min walk over to you guys. Just let me know. Thank you I hope you have an amazing day.

## 2019-04-24 NOTE — TELEPHONE ENCOUNTER
Called, spoke to pt. Two pt identifiers confirmed. Pt offered and accepted appt for 4/24/19 1315. Pt verbalized understanding of information discussed w/ no further questions at this time.

## 2019-04-24 NOTE — PROGRESS NOTES
HISTORY OF PRESENT ILLNESS  José Heath is a 35 y.o. female. HPI  Last here 1/28/19.  Pt is here for acute/routine care.     Previously, pt discussed difficulties after her sister was murdered on [address]  Pt has been seeing a therapist/counselor recently   Pt states that she has been feeling a lack of motivation recently, like she does not want to do anything  Her therapist thought pt may want to increase or change lexapro  Pt is currently taking lexapro 10mg  Will increase lexapro to 20mg  Recall zoloft helped but caused decreased libido, failed wellbutrin and effexor    Pt is still not getting a full night's sleep well  Trazadone has not helped  Pt has been trying ativan (not nightly) - however she has not been taking it right before sleep  The ativan helps her get sleepy, but she still wakes up at night  Will give amitriptyline 25mg for her to try - discussed that she can try starting with half tab  Discussed sleep hygiene  Discussed sleeping in a dark quiet room, going to bed at the same time, avoiding naps, only sleeping in bed and not other activities, going to another room for a quiet activity if woken up     BP is 108/68  Pt was given spironolactone 50mg per derm for acne      Wt today is 184 lbs --down 8 lbs x lov  Pt states she has not been working on this, just has less desire to eat  Discussed diet and weight loss       Reviewed labs 1/19    Pt saw a DEBBIE Cee (derm) on [facility]  She was given aldactone for acne     Pt had breast reduction surg 10/2/18  Pt has finished seeing the surgeon    Pt is taking vit D OTC      PREVENTIVE:  Colonoscopy: not yet needed  Pap: Dr. Maria Antonia Johns, 4/18, annually--due   Mammogram: not yet needed  Dexa: not yet needed  Tdap: 01/16/18   Pneumovax: not yet needed  Shingrix: not yet needed  Flu shot: 9/18  Eye exam: Dr. Sanju Anthony, summer 2018, will repeat in 2 years  Lipids: 1/19      Patient Active Problem List    Diagnosis Date Noted    Anxiety and depression 2018    Mixed anxiety and depressive disorder 2016     Current Outpatient Medications   Medication Sig Dispense Refill    spironolactone (ALDACTONE) 50 mg tablet       tretinoin (RETIN-A) 0.025 % topical cream       cholecalciferol, vitamin D3, (VITAMIN D3 PO) Take  by mouth.  escitalopram oxalate (LEXAPRO) 10 mg tablet Take 1 Tab by mouth daily. 30 Tab 3    LORazepam (ATIVAN) 0.5 mg tablet Take 1 Tab by mouth nightly as needed for Anxiety. Max Daily Amount: 0.5 mg. 15 Tab 0    oxymetazoline (AFRIN NO DRIP,OXYMETAZOLIN,) 0.05 % mist 1-2 sprays each nostril 2 times daily for no more than 3 days. 14.7 mL 0    ergocalciferol (ERGOCALCIFEROL) 50,000 unit capsule Take 1 Cap by mouth every seven (7) days. 16 Cap 0    traZODone (DESYREL) 50 mg tablet Take 1 Tab by mouth nightly. 30 Tab 2    cyclobenzaprine (FLEXERIL) 10 mg tablet Take 1 Tab by mouth three (3) times daily as needed for Muscle Spasm(s).  30 Tab 0     Past Surgical History:   Procedure Laterality Date    HX BREAST REDUCTION Bilateral 10/02/2018    HX  SECTION      HX TUBAL LIGATION Bilateral 2015    HX WISDOM TEETH EXTRACTION        Lab Results   Component Value Date/Time    WBC 6.7 2019 11:45 AM    HGB 11.9 2019 11:45 AM    HCT 36.2 2019 11:45 AM    PLATELET 848  11:45 AM    MCV 80 2019 11:45 AM     Lab Results   Component Value Date/Time    Cholesterol, total 172 2019 11:45 AM    HDL Cholesterol 54 2019 11:45 AM    LDL, calculated 106 (H) 2019 11:45 AM    Triglyceride 62 2019 11:45 AM     Lab Results   Component Value Date/Time    GFR est non-AA 99 2019 11:45 AM    GFR est  2019 11:45 AM    Creatinine 0.79 2019 11:45 AM    BUN 8 2019 11:45 AM    Sodium 143 2019 11:45 AM    Potassium 4.2 2019 11:45 AM    Chloride 104 2019 11:45 AM    CO2 24 2019 11:45 AM        Review of Systems   Respiratory: Negative for shortness of breath. Cardiovascular: Negative for chest pain. Physical Exam   Constitutional: She is oriented to person, place, and time. She appears well-developed and well-nourished. No distress. HENT:   Head: Normocephalic and atraumatic. Mouth/Throat: Oropharynx is clear and moist. No oropharyngeal exudate. Eyes: Conjunctivae and EOM are normal. Right eye exhibits no discharge. Left eye exhibits no discharge. Neck: Normal range of motion. Neck supple. Cardiovascular: Normal rate, regular rhythm and normal heart sounds. Exam reveals no gallop and no friction rub. No murmur heard. Pulmonary/Chest: Effort normal and breath sounds normal. No respiratory distress. She has no wheezes. She has no rales. She exhibits no tenderness. Musculoskeletal: Normal range of motion. She exhibits no edema, tenderness or deformity. Lymphadenopathy:     She has no cervical adenopathy. Neurological: She is alert and oriented to person, place, and time. Coordination normal.   Skin: Skin is warm and dry. No rash noted. She is not diaphoretic. No erythema. No pallor. Psychiatric: She has a normal mood and affect. Her behavior is normal.       ASSESSMENT and PLAN    ICD-10-CM ICD-9-CM    1. Anxiety and depression    Increase lexapro to 20mg, continue seeing therapist, if not improving would consider trying pristiq next   F41.9 300.00     F32.9 311    2. Primary insomnia    trazadone not improving her sx, ativan helps minimally, will add elavil 25mg qhs, this should improve her mood as well   F51.01 307.42    3. Obesity (BMI 30.0-34. 9)    Wt improved, continue with w/l and portion control   E66.9 278.00    4. Vitamin D deficiency    Took weekly vit D, will start OTC once this is complete   E55.9 268.9         Scribed by Iván Colin of [address] [place] [address] [address], as dictated by Dr. Lesly Shelley. Current diagnosis and concerns discussed with pt at length.  Pt understands risks and benefits or current treatment plan and medications, and accepts the treatment and medication with any possible risks. Pt asks appropriate questions, which were answered. Pt was instructed to call with any concerns or problems. I have reviewed the note documented by the scribe. The services provided are my own.   The documentation is accurate

## 2019-06-02 DIAGNOSIS — F51.01 PRIMARY INSOMNIA: Primary | ICD-10-CM

## 2019-06-02 RX ORDER — ZOLPIDEM TARTRATE 5 MG/1
5 TABLET ORAL
Qty: 30 TAB | Refills: 1 | Status: SHIPPED | OUTPATIENT
Start: 2019-06-02 | End: 2020-07-24

## 2019-10-30 ENCOUNTER — TELEPHONE (OUTPATIENT)
Dept: INTERNAL MEDICINE CLINIC | Age: 34
End: 2019-10-30

## 2019-10-30 NOTE — TELEPHONE ENCOUNTER
Spoke to Kaylie at [employer]. Kaylie inquiring on MÓNICA sent to the office. Kaylie informed that MÓNICA received and sent to medical records department. Kaylie asking for the medical record dept. Kaylie given contact info to [facility]. No further action needed for now.

## 2019-10-30 NOTE — TELEPHONE ENCOUNTER
Caller's first and last name: Bard Arce, [employer], [address]   Reason for call: Confirmation if practice has received documents via fax.    Callback required yes/no and why: yes   Best contact number(s): [phone]   Details to clarify the request:   christin

## 2019-11-04 ENCOUNTER — TELEPHONE (OUTPATIENT)
Dept: INTERNAL MEDICINE CLINIC | Age: 34
End: 2019-11-04

## 2019-11-04 ENCOUNTER — OFFICE VISIT (OUTPATIENT)
Dept: INTERNAL MEDICINE CLINIC | Age: 34
End: 2019-11-04

## 2019-11-04 DIAGNOSIS — F41.9 ANXIETY AND DEPRESSION: ICD-10-CM

## 2019-11-04 DIAGNOSIS — E78.2 MIXED HYPERLIPIDEMIA: ICD-10-CM

## 2019-11-04 DIAGNOSIS — F32.A ANXIETY AND DEPRESSION: ICD-10-CM

## 2019-11-04 DIAGNOSIS — F41.8 MIXED ANXIETY AND DEPRESSIVE DISORDER: Primary | ICD-10-CM

## 2019-11-04 DIAGNOSIS — E55.9 VITAMIN D DEFICIENCY: ICD-10-CM

## 2019-11-04 DIAGNOSIS — E66.3 OVERWEIGHT: ICD-10-CM

## 2019-11-04 RX ORDER — ERGOCALCIFEROL 1.25 MG/1
50000 CAPSULE ORAL
Qty: 4 CAP | Refills: 3 | Status: SHIPPED | OUTPATIENT
Start: 2019-11-04 | End: 2020-07-24

## 2019-11-04 RX ORDER — LORAZEPAM 0.5 MG/1
0.5 TABLET ORAL
Qty: 30 TAB | Refills: 1 | Status: SHIPPED | OUTPATIENT
Start: 2019-11-04 | End: 2020-06-17 | Stop reason: SDUPTHER

## 2019-11-04 RX ORDER — ESCITALOPRAM OXALATE 20 MG/1
20 TABLET ORAL DAILY
Qty: 30 TAB | Refills: 2 | Status: SHIPPED | OUTPATIENT
Start: 2019-11-04 | End: 2020-07-24

## 2019-11-04 NOTE — LETTER
NOTIFICATION RETURN TO WORK / SCHOOL 
 
11/4/2019 11:24 AM 
 
Ms. Nicole [facility] 7 [phone] To Whom It May Concern: 
 
Jm Jacques is currently under the care of [facility]. She will return to work/school on: Monday, November 4, 2019. If there are questions or concerns please have the patient contact our office.  
 
 
 
Sincerely, 
 
 
Jasper Xiong MD

## 2019-11-04 NOTE — TELEPHONE ENCOUNTER
Left vm for pt asking if pt can return. Pt left w/o having vital signs completed. MyChart sent to pt as well.

## 2019-11-04 NOTE — TELEPHONE ENCOUNTER
Called, spoke to pt. Two pt identifiers confirmed. Pt informed per Dr. Leo Holguin that VS's were not taken today. Pt states having to rush back to work. Pt states that she will come by around 1230 11/5/19 to have them done. Pt verbalized understanding of information discussed w/ no further questions at this time.

## 2019-11-04 NOTE — PROGRESS NOTES
HISTORY OF PRESENT ILLNESS  Jan Sahu is a 29 y.o. female. HPI   Last here 4/24/19. Pt is here for routine care.    Will give a note for work to confirm she was here today      BP is 108/68   Pt was given spironolactone 50mg per derm for acne but she is no longer taking this       Wt today is 183 lbs-- stable x lov   Discussed diet and weight loss       Reviewed labs 1/19  Ordered labs      Pt saw a DEBBIE Cee (derm) on [facility]  She was given aldactone for acne     Pt had breast reduction surg 10/2/18  Pt has finished seeing the surgeon    Pt is not taking vit D OTC  Will order vit D weekly       Previously, pt discussed difficulties after her sister was murdered on [address]  Pt has been seeing a therapist/counselor recently  Has ativan to use prn  Pt states she has been having to use this every other day recently due to increased stress and holidays coming up without her sister   States she has had moments of intense anxiety, feels like panic attacks with rapid heart rate   Also feels like stress from work carries home with her   Lov, increased lexapro to 20mg however pt stopped this   Pt states she is trying to change her situation such as changing her job   States lexapro helped a little bit but she did not have emotions and felt like she was going through emotions   Discussed its okay to use ativan sporadically but if she needs it every day then she needs to be on a daily med   Discussed no need to add daily meds if she feels like sxs are contained to just this month   Pt thinks next 3-4 mos will be difficult   Discussed med options to go back on for next 3-4 mos   Will restart lexapro 20 mg, discussed to take 10 mg for 1st week and then go to full tab   Discussed she can transition to just ativan prn after this difficult time     Recall zoloft helped but caused decreased libido, failed wellbutrin and effexor       Lov, pt c/o not sleeping well   Lov, gave  amitriptyline 25mg for her to try however pt has not been taking this   Has been taking ambien prn         PREVENTIVE:  Colonoscopy: not yet needed  Pap: Torsten Chase 19   Mammogram: not yet needed  Dexa: not yet needed  Tdap: 18   Pneumovax: not yet needed  Shingrix: not yet needed  Flu shot: 2019   Eye exam: Dr. Marycarmen Jacob , will repeat in 2 years  Lipids:         Patient Active Problem List    Diagnosis Date Noted    Anxiety and depression 2018    Mixed anxiety and depressive disorder 2016     Current Outpatient Medications   Medication Sig Dispense Refill    zolpidem (AMBIEN) 5 mg tablet Take 1 Tab by mouth nightly as needed for Sleep. Max Daily Amount: 5 mg. 30 Tab 1    spironolactone (ALDACTONE) 50 mg tablet       tretinoin (RETIN-A) 0.025 % topical cream       cholecalciferol, vitamin D3, (VITAMIN D3 PO) Take  by mouth.  amitriptyline (ELAVIL) 25 mg tablet Take 1 Tab by mouth nightly. 30 Tab 1    escitalopram oxalate (LEXAPRO) 20 mg tablet Take 1 Tab by mouth daily. 30 Tab 3    LORazepam (ATIVAN) 0.5 mg tablet Take 1 Tab by mouth nightly as needed for Anxiety.  Max Daily Amount: 0.5 mg. 15 Tab 0     Past Surgical History:   Procedure Laterality Date    HX BREAST REDUCTION Bilateral 10/02/2018    HX  SECTION      HX TUBAL LIGATION Bilateral 2015    HX WISDOM TEETH EXTRACTION        Lab Results   Component Value Date/Time    WBC 6.7 2019 11:45 AM    HGB 11.9 2019 11:45 AM    HCT 36.2 2019 11:45 AM    PLATELET 339  11:45 AM    MCV 80 2019 11:45 AM     Lab Results   Component Value Date/Time    Cholesterol, total 172 2019 11:45 AM    HDL Cholesterol 54 2019 11:45 AM    LDL, calculated 106 (H) 2019 11:45 AM    Triglyceride 62 2019 11:45 AM     Lab Results   Component Value Date/Time    GFR est non-AA 99 2019 11:45 AM    GFR est  2019 11:45 AM    Creatinine 0.79 2019 11:45 AM    BUN 8 2019 11:45 AM    Sodium 143 01/28/2019 11:45 AM    Potassium 4.2 01/28/2019 11:45 AM    Chloride 104 01/28/2019 11:45 AM    CO2 24 01/28/2019 11:45 AM        Review of Systems   Respiratory: Negative for shortness of breath. Cardiovascular: Negative for chest pain. Physical Exam   Constitutional: She is oriented to person, place, and time. She appears well-developed and well-nourished. No distress. HENT:   Head: Normocephalic and atraumatic. Mouth/Throat: Oropharynx is clear and moist. No oropharyngeal exudate. Eyes: Conjunctivae and EOM are normal. Right eye exhibits no discharge. Left eye exhibits no discharge. Neck: Normal range of motion. Neck supple. Cardiovascular: Normal rate, regular rhythm and normal heart sounds. Exam reveals no gallop and no friction rub. No murmur heard. Pulmonary/Chest: Effort normal and breath sounds normal. No respiratory distress. She has no wheezes. She has no rales. She exhibits no tenderness. Musculoskeletal: Normal range of motion. She exhibits no edema, tenderness or deformity. Lymphadenopathy:     She has no cervical adenopathy. Neurological: She is alert and oriented to person, place, and time. Coordination normal.   Skin: Skin is warm and dry. No rash noted. She is not diaphoretic. No erythema. No pallor. Psychiatric: She has a normal mood and affect. Her behavior is normal.       ASSESSMENT and PLAN    ICD-10-CM ICD-9-CM    1. Mixed anxiety and depressive disorder                Pt self dc'ed lexapro several months ago she continues to see a therapist however this time of year is difficult for her given her sisters death last year and she has been using ativan more discussed backing off this and will restart lexapro 20 mg daily  F41.8 300.4 LIPID PANEL      METABOLIC PANEL, COMPREHENSIVE      CBC W/O DIFF      VITAMIN D, 25 HYDROXY      TSH 3RD GENERATION   2.  Vitamin D deficiency              Not taking vit d again will reorder weekly vit d her levels were very low last year and will repeat d levels  E55.9 268.9 LIPID PANEL      METABOLIC PANEL, COMPREHENSIVE      CBC W/O DIFF      VITAMIN D, 25 HYDROXY      TSH 3RD GENERATION   3. Mixed hyperlipidemia            Diet controlled will repeat lipids  E78.2 272.2 LIPID PANEL      METABOLIC PANEL, COMPREHENSIVE      CBC W/O DIFF      VITAMIN D, 25 HYDROXY      TSH 3RD GENERATION   4. Overweight              Counseled on portion control ww  E66.3 278.02 LIPID PANEL      METABOLIC PANEL, COMPREHENSIVE      CBC W/O DIFF      VITAMIN D, 25 HYDROXY      TSH 3RD GENERATION              Scribed by Sylvester Lazo of Radha Cabrera, as dictated by Dr. Da Bautista. Current diagnosis and concerns discussed with pt at length. Pt understands risks and benefits or current treatment plan and medications, and accepts the treatment and medication with any possible risks. Pt asks appropriate questions, which were answered. Pt was instructed to call with any concerns or problems. I have reviewed the note documented by the scribe. The services provided are my own.   The documentation is accurate

## 2019-11-05 VITALS
OXYGEN SATURATION: 97 % | TEMPERATURE: 98.1 F | SYSTOLIC BLOOD PRESSURE: 107 MMHG | HEIGHT: 66 IN | BODY MASS INDEX: 29.57 KG/M2 | HEART RATE: 76 BPM | RESPIRATION RATE: 16 BRPM | DIASTOLIC BLOOD PRESSURE: 69 MMHG | WEIGHT: 184 LBS

## 2020-06-17 ENCOUNTER — VIRTUAL VISIT (OUTPATIENT)
Dept: INTERNAL MEDICINE CLINIC | Age: 35
End: 2020-06-17

## 2020-06-17 DIAGNOSIS — F32.A ANXIETY AND DEPRESSION: Primary | ICD-10-CM

## 2020-06-17 DIAGNOSIS — F41.9 ANXIETY AND DEPRESSION: Primary | ICD-10-CM

## 2020-06-17 RX ORDER — LORAZEPAM 0.5 MG/1
0.5 TABLET ORAL
Qty: 30 TAB | Refills: 1 | Status: SHIPPED | OUTPATIENT
Start: 2020-06-17

## 2020-06-17 RX ORDER — BUSPIRONE HYDROCHLORIDE 7.5 MG/1
7.5 TABLET ORAL 3 TIMES DAILY
Qty: 90 TAB | Refills: 1 | Status: SHIPPED | OUTPATIENT
Start: 2020-06-17
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
## 2020-06-29 ENCOUNTER — PATIENT MESSAGE (OUTPATIENT)
Dept: INTERNAL MEDICINE CLINIC | Age: 35
End: 2020-06-29

## 2020-06-29 ENCOUNTER — TELEPHONE (OUTPATIENT)
Dept: INTERNAL MEDICINE CLINIC | Age: 35
End: 2020-06-29

## 2020-06-29 NOTE — TELEPHONE ENCOUNTER
Patient states she needs a call back This Morning Asap in reference to [place] papers being done that were sent last week. Patient states she is on the Schedule for Today & needs to know this morning Status of Completion to advise Employer. Patient states a detailed message can be left on her voice mail. Please call.  Thank you

## 2020-07-02 ENCOUNTER — PATIENT MESSAGE (OUTPATIENT)
Dept: INTERNAL MEDICINE CLINIC | Age: 35
End: 2020-07-02

## 2020-07-08 ENCOUNTER — VIRTUAL VISIT (OUTPATIENT)
Dept: INTERNAL MEDICINE CLINIC | Age: 35
End: 2020-07-08

## 2020-07-08 DIAGNOSIS — F41.8 MIXED ANXIETY AND DEPRESSIVE DISORDER: Primary | ICD-10-CM

## 2020-07-08 DIAGNOSIS — F43.21 GRIEF REACTION: ICD-10-CM

## 2020-07-08 NOTE — PROGRESS NOTES
Identified pt with two pt identifiers(name and ). Reviewed record in preparation for visit and have obtained necessary documentation. Chief Complaint   Patient presents with    Anxiety     F/U    Depression        Health Maintenance Due   Topic    PAP AKA CERVICAL CYTOLOGY        Med Reconciliation: Completed    Coordination of Care Questionnaire:  :   1) Have you been to an emergency room, urgent care, or hospitalized since your last visit? If yes, where when, and reason for visit? No       2. Have seen or consulted any other health care provider since your last visit? If yes, where when, and reason for visit? No       3) Do you have an Advanced Directive/ Living Will in place? No  If yes, do we have a copy on file   If no, would you like information       This is an established visit conducted via telemedicine. The patient has been instructed that this meets HIPAA criteria and acknowledges and agrees to this method of visitation.     Jill Doan  20  2:10 PM
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
## 2020-07-20 ENCOUNTER — PATIENT MESSAGE (OUTPATIENT)
Dept: INTERNAL MEDICINE CLINIC | Age: 35
End: 2020-07-20

## 2020-07-21 ENCOUNTER — PATIENT MESSAGE (OUTPATIENT)
Dept: INTERNAL MEDICINE CLINIC | Age: 35
End: 2020-07-21

## 2020-07-21 NOTE — TELEPHONE ENCOUNTER
Called, spoke with Pt. Two pt identifiers confirmed. Pt stated per [facility] line her papers was not received. Pt stated [facility] received her paperwork but not the notes. Pt informed her papers went to scanning and unfortunately, don't have them to refax but if she can send them again, I can get them filled out and faxed tomorrow so her claim is not denied. Pt stated [facility]. Pt also informed faxed last two office notes to [facility] with confirmation received to [phone]. Pt verbalized understanding of information discussed w/ no further questions at this time.

## 2020-07-24 ENCOUNTER — VIRTUAL VISIT (OUTPATIENT)
Dept: INTERNAL MEDICINE CLINIC | Age: 35
End: 2020-07-24

## 2020-07-24 DIAGNOSIS — F41.8 MIXED ANXIETY AND DEPRESSIVE DISORDER: Primary | ICD-10-CM

## 2020-07-24 NOTE — PROGRESS NOTES
Identified pt with two pt identifiers(name and ). Reviewed record in preparation for visit and have obtained necessary documentation. Chief Complaint   Patient presents with    Depression     f/u    Anxiety        There were no vitals taken for this visit. Health Maintenance Due   Topic    PAP AKA CERVICAL CYTOLOGY        Med Reconciliation: Completed    Coordination of Care Questionnaire:  :   1) Have you been to an emergency room, urgent care, or hospitalized since your last visit? If yes, where when, and reason for visit? No       2. Have seen or consulted any other health care provider since your last visit? If yes, where when, and reason for visit? No       3) Do you have an Advanced Directive/ Living Will in place? No  If yes, do we have a copy on file   If no, would you like information       This is an established visit conducted via telemedicine. The patient has been instructed that this meets HIPAA criteria and acknowledges and agrees to this method of visitation.     Idalia Rendon  20  11:52 AM

## 2020-07-24 NOTE — PROGRESS NOTES
Jm Jacques is a 28 y.o. female who was seen by synchronous (real-time) audio-video technology on 7/24/2020 for Depression (f/u) and Anxiety  She is doing well and reports things have improved on the medication ( Buspar). She is returning to work on 8/1/20. Assessment & Plan:   Diagnoses and all orders for this visit:    1. Mixed anxiety and depressive disorder      Patient is stable on the current medication and will remain on this regimen. She will follow up in 3 months. Subjective:       Prior to Admission medications    Medication Sig Start Date End Date Taking? Authorizing Provider   LORazepam (ATIVAN) 0.5 mg tablet Take 1 Tab by mouth nightly as needed for Anxiety. Max Daily Amount: 0.5 mg. 6/17/20  Yes Elva Michaud MD   busPIRone (BUSPAR) 7.5 mg tablet Take 1 Tab by mouth three (3) times daily. 6/17/20  Yes Elva Michaud MD   ergocalciferol (ERGOCALCIFEROL) 50,000 unit capsule Take 1 Cap by mouth every seven (7) days. Patient not taking: Reported on 7/8/2020 11/4/19   Haylee Abraham MD   escitalopram oxalate (LEXAPRO) 20 mg tablet Take 1 Tab by mouth daily. Patient not taking: Reported on 7/8/2020 11/4/19   Haylee Abraham MD   zolpidem (AMBIEN) 5 mg tablet Take 1 Tab by mouth nightly as needed for Sleep. Max Daily Amount: 5 mg. Patient not taking: Reported on 7/24/2020 6/2/19   Haylee Abraham MD   tretinoin (RETIN-A) 0.025 % topical cream  4/16/19   Provider, Historical   cholecalciferol, vitamin D3, (VITAMIN D3 PO) Take  by mouth. Provider, Historical         Review of Systems   Constitutional: Negative. HENT: Negative. Eyes: Negative. Respiratory: Negative. Cardiovascular: Negative. Gastrointestinal: Negative. Genitourinary: Negative. Musculoskeletal: Negative. Skin: Negative. Neurological: Negative. Psychiatric/Behavioral: Positive for depression. The patient is nervous/anxious.          Symptoms of anxiety and depression have improved per patient       Objective:     Patient-Reported Vitals 6/17/2020   Patient-Reported LMP 05/2020        [INSTRUCTIONS:  \"[x]\" Indicates a positive item  \"[]\" Indicates a negative item  -- DELETE ALL ITEMS NOT EXAMINED]    Constitutional: [x] Appears well-developed and well-nourished [x] No apparent distress      [] Abnormal -     Mental status: [x] Alert and awake  [x] Oriented to person/place/time [x] Able to follow commands    [] Abnormal -     Eyes:   EOM    [x]  Normal    [] Abnormal -   Sclera  [x]  Normal    [] Abnormal -          Discharge [x]  None visible   [] Abnormal -     HENT: [x] Normocephalic, atraumatic  [] Abnormal -   [x] Mouth/Throat: Mucous membranes are moist    External Ears [x] Normal  [] Abnormal -    Neck: [x] No visualized mass [] Abnormal -     Pulmonary/Chest: [x] Respiratory effort normal   [x] No visualized signs of difficulty breathing or respiratory distress        [] Abnormal -      Musculoskeletal:   [x] Normal gait with no signs of ataxia         [x] Normal range of motion of neck        [] Abnormal -     Neurological:        [x] No Facial Asymmetry (Cranial nerve 7 motor function) (limited exam due to video visit)          [x] No gaze palsy        [] Abnormal -          Skin:        [x] No significant exanthematous lesions or discoloration noted on facial skin         [] Abnormal -            Psychiatric:       [x] Normal Affect [] Abnormal -        [x] No Hallucinations    Other pertinent observable physical exam findings:-        We discussed the expected course, resolution and complications of the diagnosis(es) in detail. Medication risks, benefits, costs, interactions, and alternatives were discussed as indicated. I advised her to contact the office if her condition worsens, changes or fails to improve as anticipated. She expressed understanding with the diagnosis(es) and plan.        Maggie Blanc, who was evaluated through a patient-initiated, synchronous (real-time) audio-video encounter, and/or her healthcare decision maker, is aware that it is a billable service, with coverage as determined by her insurance carrier. She provided verbal consent to proceed: Yes, and patient identification was verified. It was conducted pursuant to the emergency declaration under the [address] [place] [address] authority and the [employer] General Act. A caregiver was present when appropriate. Ability to conduct physical exam was limited. I was at home. The patient was at home.       Farzaneh Burch MD

## 2020-07-30 NOTE — Clinical Note
2 wks Pt reporting heartburn to pt, SLIM and Cardiology notified and will place order       Camila Tobin RN  07/30/20 [id number]

## 2020-09-05 ENCOUNTER — NURSE TRIAGE (OUTPATIENT)
Dept: OTHER | Facility: CLINIC | Age: 35
End: 2020-09-05
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
## 2020-11-08 ENCOUNTER — NURSE TRIAGE (OUTPATIENT)
Dept: OTHER | Facility: CLINIC | Age: 35
End: 2020-11-08
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
## 2020-12-14 ENCOUNTER — VIRTUAL VISIT (OUTPATIENT)
Dept: INTERNAL MEDICINE CLINIC | Age: 35
End: 2020-12-14
Payer: COMMERCIAL

## 2020-12-14 DIAGNOSIS — M25.511 CHRONIC RIGHT SHOULDER PAIN: ICD-10-CM

## 2020-12-14 DIAGNOSIS — F41.9 ANXIETY AND DEPRESSION: Primary | ICD-10-CM

## 2020-12-14 DIAGNOSIS — F32.A ANXIETY AND DEPRESSION: Primary | ICD-10-CM

## 2020-12-14 DIAGNOSIS — Z00.00 PHYSICAL EXAM, ANNUAL: ICD-10-CM

## 2020-12-14 DIAGNOSIS — G89.29 CHRONIC RIGHT SHOULDER PAIN: ICD-10-CM

## 2020-12-14 PROCEDURE — 99213 OFFICE O/P EST LOW 20 MIN: CPT | Performed by: INTERNAL MEDICINE

## 2020-12-14 RX ORDER — DICLOFENAC SODIUM 75 MG/1
75 TABLET, DELAYED RELEASE ORAL
Qty: 60 TAB | Refills: 0 | Status: SHIPPED | OUTPATIENT
Start: 2020-12-14

## 2020-12-14 NOTE — LETTER
12/14/2020 2:25 PM    [phone]    To whom it may concern,    Mrs. Kizzy Green is to not push, pull, or lift anything over 10lbs until after 12/28/20. If there are any questions or concerns, please give our office a call at [phone].            Sincerely,      Patrice Stinson MD

## 2020-12-14 NOTE — PROGRESS NOTES
HISTORY OF PRESENT ILLNESS  Barrington Barton is a 28 y.o. female. HPI     Last here 11/4/19Pt is here for routine care.  This is an established visit completed with telemedicine was completed with video assist  the patient acknowledges and agrees to this method of visitation doxyme      She c/o R shoulder pain x 3 weeks  When she rotates her arm, she can hear clicking   Denies tingling/weakness in her hands  It has been getting progressively worse, she is not taking anything for this  Will give diclofenac, discussed avoiding heavy pulling/pushing  Will give work note to not allow heavy lifting/pulling > 10 lbs  Discussed next step would be getting xray      Pt was given spironolactone 50mg per derm for acne but she is no longer taking this       Wt was 183 lbs lov  Discussed diet and weight loss       Reviewed labs       Pt saw a DEBBIE Cee (derm) on [address]  She was given aldactone for acne no longer taking this     Pt had breast reduction surg 10/2/18  Pt has finished seeing the surgeon    Previously, pt discussed difficulties after her sister was murdered on [address]  She saw Dr. Yovana Chen who gave buspar and ativan   She prev was on lexapro  Has not needed any meds, doing well 12/20  Recall zoloft helped but caused decreased libido, failed wellbutrin and effexor     She has amitriptyline 25mg for sleep but she was not taking this  Has been taking ambien prn          PREVENTIVE:  Colonoscopy: not yet needed  Pap: 606/706 Kalia Elena 10/20  Mammogram: not yet needed  Dexa: not yet needed  Tdap: 01/16/18   Pneumovax: not yet needed  Shingrix: not yet needed  Flu shot: 10/20  Eye exam: Dr. Nadia Rose 2018, will repeat in 2 years  Lipids: 1/19     Patient Active Problem List    Diagnosis Date Noted    Anxiety and depression 01/16/2018    Mixed anxiety and depressive disorder 04/04/2016     Current Outpatient Medications   Medication Sig Dispense Refill    diclofenac EC (VOLTAREN) 75 mg EC tablet Take 1 Tab by mouth two (2) times daily as needed for Pain. 60 Tab 0    LORazepam (ATIVAN) 0.5 mg tablet Take 1 Tab by mouth nightly as needed for Anxiety. Max Daily Amount: 0.5 mg. 30 Tab 1    busPIRone (BUSPAR) 7.5 mg tablet Take 1 Tab by mouth three (3) times daily. 90 Tab 1    tretinoin (RETIN-A) 0.025 % topical cream       cholecalciferol, vitamin D3, (VITAMIN D3 PO) Take  by mouth. Past Surgical History:   Procedure Laterality Date    HX BREAST REDUCTION Bilateral 10/02/2018    HX  SECTION      HX TUBAL LIGATION Bilateral 2015    HX WISDOM TEETH EXTRACTION        Lab Results   Component Value Date/Time    WBC 6.7 2019 11:45 AM    HGB 11.9 2019 11:45 AM    HCT 36.2 2019 11:45 AM    PLATELET 075 4923 11:45 AM    MCV 80 2019 11:45 AM     Lab Results   Component Value Date/Time    Cholesterol, total 172 2019 11:45 AM    HDL Cholesterol 54 2019 11:45 AM    LDL, calculated 106 (H) 2019 11:45 AM    Triglyceride 62 2019 11:45 AM     Lab Results   Component Value Date/Time    GFR est non-AA 99 2019 11:45 AM    GFR est  2019 11:45 AM    Creatinine 0.79 2019 11:45 AM    BUN 8 2019 11:45 AM    Sodium 143 2019 11:45 AM    Potassium 4.2 2019 11:45 AM    Chloride 104 2019 11:45 AM    CO2 24 2019 11:45 AM        Review of Systems   Respiratory: Negative for shortness of breath. Cardiovascular: Negative for chest pain. Musculoskeletal: Positive for joint pain. Physical Exam  Constitutional:       General: She is not in acute distress. Appearance: Normal appearance. She is not ill-appearing, toxic-appearing or diaphoretic. HENT:      Head: Normocephalic and atraumatic. Eyes:      General:         Right eye: No discharge. Left eye: No discharge. Conjunctiva/sclera: Conjunctivae normal.   Pulmonary:      Effort: No respiratory distress.    Neurological:      Mental Status: She is alert and oriented to person, place, and time. Mental status is at baseline. Gait: Gait normal.   Psychiatric:         Mood and Affect: Mood normal.         Behavior: Behavior normal.         ASSESSMENT and PLAN    ICD-10-CM ICD-9-CM    1. Anxiety and depression         Was in the past on Lexapro and BuSpar at one point doing well without medication   F41.9 300.00 LIPID PANEL    J16.0 555 METABOLIC PANEL, COMPREHENSIVE      CBC W/O DIFF      HEMOGLOBIN A1C WITH EAG      TSH 3RD GENERATION   2. Physical exam, annual  Z00.00 V70.0 LIPID PANEL      METABOLIC PANEL, COMPREHENSIVE   Due for labs ordered    Flu shot up-to-date    Tdap up-to-date    Eye exam due    Pelvic up-to-date       CBC W/O DIFF      HEMOGLOBIN A1C WITH EAG      TSH 3RD GENERATION   3. Chronic right shoulder pain     Check plain film discussed no heavy lifting or pushing for 2 weeks over 10 pounds to improve her symptoms    We will give diclofenac for as needed use    Marcell film if not improving     M25.511 719.41 XR SHOULDER RT AP/LAT MIN 2 V    G89.29 338.29            Scribed by Janusz Nayak, as dictated by Dr. Gloria Breaux. Current diagnosis and concerns discussed with pt at length. Pt understands risks and benefits or current treatment plan and medications, and accepts the treatment and medication with any possible risks. Pt asks appropriate questions, which were answered. Pt was instructed to call with any concerns or problems. I have reviewed the note documented by the scribe. The services provided are my own. The documentation is accurate     Thanh Mccord, who was evaluated through a synchronous (real-time) audio-video encounter, and/or her healthcare decision maker, is aware that it is a billable service, with coverage as determined by her insurance carrier. She provided verbal consent to proceed: Yes, and patient identification was verified.  It was conducted pursuant to the emergency declaration under the [facility] waiver authority and the [employer] and [employer] General Act. A caregiver was present when appropriate. Ability to conduct physical exam was limited. I was at home. The patient was at home.

## 2021-10-11 ENCOUNTER — DOCUMENTATION ONLY (OUTPATIENT)
Dept: INTERNAL MEDICINE CLINIC | Age: 36
End: 2021-10-11

## 2022-03-19 PROBLEM — F41.9 ANXIETY AND DEPRESSION: Status: ACTIVE | Noted: 2018-01-16

## 2022-03-19 PROBLEM — F32.A ANXIETY AND DEPRESSION: Status: ACTIVE | Noted: 2018-01-16

## 2022-05-30 ENCOUNTER — HOSPITAL ENCOUNTER (EMERGENCY)
Age: 37
Discharge: HOME | End: 2022-05-30
Attending: STUDENT IN AN ORGANIZED HEALTH CARE EDUCATION/TRAINING PROGRAM
Payer: COMMERCIAL

## 2022-05-30 VITALS
RESPIRATION RATE: 16 BRPM | SYSTOLIC BLOOD PRESSURE: 118 MMHG | DIASTOLIC BLOOD PRESSURE: 85 MMHG | BODY MASS INDEX: 32.99 KG/M2 | OXYGEN SATURATION: 99 % | HEART RATE: 74 BPM | TEMPERATURE: 97.4 F | WEIGHT: 204.37 LBS

## 2022-05-30 DIAGNOSIS — L02.91 ABSCESS: Primary | ICD-10-CM

## 2022-05-30 PROCEDURE — 74011000250 HC RX REV CODE- 250: Performed by: STUDENT IN AN ORGANIZED HEALTH CARE EDUCATION/TRAINING PROGRAM

## 2022-05-30 PROCEDURE — 99283 EMERGENCY DEPT VISIT LOW MDM: CPT

## 2022-05-30 PROCEDURE — 74011250637 HC RX REV CODE- 250/637: Performed by: STUDENT IN AN ORGANIZED HEALTH CARE EDUCATION/TRAINING PROGRAM

## 2022-05-30 PROCEDURE — 75810000289 HC I&D ABSCESS SIMP/COMP/MULT

## 2022-05-30 RX ORDER — ONDANSETRON 4 MG/1
4 TABLET, ORALLY DISINTEGRATING ORAL
Status: COMPLETED | OUTPATIENT
Start: 2022-05-30 | End: 2022-05-30

## 2022-05-30 RX ORDER — LIDOCAINE HYDROCHLORIDE AND EPINEPHRINE 10; 10 MG/ML; UG/ML
1.5 INJECTION, SOLUTION INFILTRATION; PERINEURAL
Status: COMPLETED | OUTPATIENT
Start: 2022-05-30 | End: 2022-05-30

## 2022-05-30 RX ORDER — HYDROCODONE BITARTRATE AND ACETAMINOPHEN 5; 325 MG/1; MG/1
1 TABLET ORAL
Status: COMPLETED | OUTPATIENT
Start: 2022-05-30 | End: 2022-05-30

## 2022-05-30 RX ORDER — HYDROCODONE BITARTRATE AND ACETAMINOPHEN 5; 325 MG/1; MG/1
1 TABLET ORAL
Qty: 8 TABLET | Refills: 0 | Status: SHIPPED | OUTPATIENT
Start: 2022-05-30 | End: 2022-06-01

## 2022-05-30 RX ADMIN — HYDROCODONE BITARTRATE AND ACETAMINOPHEN 1 TABLET: 5; 325 TABLET ORAL at 09:39

## 2022-05-30 RX ADMIN — LIDOCAINE HYDROCHLORIDE,EPINEPHRINE BITARTRATE 15 MG: 10; .01 INJECTION, SOLUTION INFILTRATION; PERINEURAL at 09:41

## 2022-05-30 RX ADMIN — ONDANSETRON 4 MG: 4 TABLET, ORALLY DISINTEGRATING ORAL at 09:39

## 2022-05-30 NOTE — ED PROVIDER NOTES
Patient is a healthy 59-year-old female presenting to the ED with pain in her left axilla with a raised area consistent with abscess. Patient has no history of hidradenitis suppurativa. Patient does not have shaved armpits reducing risk for abscess. The history is provided by the patient and the spouse. Skin Problem   This is a new problem. The current episode started 2 days ago. The problem has been gradually worsening. The problem is associated with nothing. There has been no fever. The rash is present on the trunk. The pain is at a severity of 5/10. The pain is moderate. The pain has been constant since onset. Associated symptoms include pain. Treatments tried: Warm compress. The treatment provided no relief. Past Medical History:   Diagnosis Date    Heart murmur     History of      Preeclampsia        Past Surgical History:   Procedure Laterality Date    HX BREAST REDUCTION Bilateral 10/02/2018    HX  SECTION      HX TUBAL LIGATION Bilateral 2015    HX WISDOM TEETH EXTRACTION           Family History:   Problem Relation Age of Onset    Hypertension Mother     Diabetes Father     OSTEOARTHRITIS Maternal Aunt     Stroke Maternal Grandmother        Social History     Socioeconomic History    Marital status:      Spouse name: Not on file    Number of children: Not on file    Years of education: Not on file    Highest education level: Not on file   Occupational History    Not on file   Tobacco Use    Smoking status: Never Smoker    Smokeless tobacco: Never Used   Substance and Sexual Activity    Alcohol use:  Yes     Alcohol/week: 0.0 standard drinks     Types: 1 - 2 Standard drinks or equivalent per week     Comment: RARE    Drug use: No    Sexual activity: Yes     Partners: Male     Birth control/protection: None, Pill, Surgical     Comment:    Other Topics Concern    Not on file   Social History Narrative    Not on file     Social Determinants of Health     Financial Resource Strain:     Difficulty of Paying Living Expenses: Not on file   Food Insecurity:     Worried About Running Out of Food in the Last Year: Not on file    Jeovany of Food in the Last Year: Not on file   Transportation Needs:     Lack of Transportation (Medical): Not on file    Lack of Transportation (Non-Medical): Not on file   Physical Activity:     Days of Exercise per Week: Not on file    Minutes of Exercise per Session: Not on file   Stress:     Feeling of Stress : Not on file   Social Connections:     Frequency of Communication with Friends and Family: Not on file    Frequency of Social Gatherings with Friends and Family: Not on file    Attends Hoahaoism Services: Not on file    Active Member of [address] [place] [address] [employer] or Organizations: Not on file    Attends Club or Organization Meetings: Not on file    Marital Status: Not on file   Intimate Partner Violence:     Fear of Current or Ex-Partner: Not on file    Emotionally Abused: Not on file    Physically Abused: Not on file    Sexually Abused: Not on file   Housing Stability:     Unable to Pay for Housing in the Last Year: Not on file    Number of [place] in the Last Year: Not on file    Unstable Housing in the Last Year: Not on file         ALLERGIES: Percocet [oxycodone-acetaminophen] and Prednisone    Review of Systems   Constitutional: Negative. HENT: Negative. Eyes: Negative. Respiratory: Negative. Cardiovascular: Negative. Gastrointestinal: Negative. Endocrine: Negative. Genitourinary: Negative. Musculoskeletal: Negative. Skin: Positive for color change. Allergic/Immunologic: Negative. Neurological: Negative. Hematological: Negative. Psychiatric/Behavioral: Negative.         Vitals:    05/30/22 1000 05/30/22 1030 05/30/22 1045 05/30/22 1130   BP: 115/81 115/83 120/89 118/85   Pulse:       Resp:  16     Temp:       SpO2:  99%     Weight:                Physical Exam  Vitals and nursing note reviewed. Constitutional:       General: She is not in acute distress. Appearance: Normal appearance. HENT:      Head: Normocephalic and atraumatic. Right Ear: External ear normal.      Left Ear: External ear normal.      Nose: Nose normal.   Eyes:      Extraocular Movements: Extraocular movements intact. Conjunctiva/sclera: Conjunctivae normal.   Cardiovascular:      Rate and Rhythm: Normal rate. Pulses: Normal pulses. Radial pulses are 2+ on the right side and 2+ on the left side. Heart sounds: Normal heart sounds. Pulmonary:      Effort: Pulmonary effort is normal.      Breath sounds: Normal breath sounds. Chest:      Chest wall: No deformity or tenderness. Abdominal:      General: Abdomen is flat. There is no distension. Tenderness: There is no abdominal tenderness. Musculoskeletal:         General: No deformity or signs of injury. Normal range of motion. Cervical back: Normal range of motion and neck supple. No tenderness. Skin:     General: Skin is warm and dry. Capillary Refill: Capillary refill takes less than 2 seconds. Comments: Erythematous fluctuant area in the left axilla consistent with abscess   Neurological:      General: No focal deficit present. Mental Status: She is alert and oriented to person, place, and time.    Psychiatric:         Attention and Perception: Attention normal.         Mood and Affect: Mood normal.         Behavior: Behavior normal.          MDM  Number of Diagnoses or Management Options         MEDICATIONS GIVEN:  Medications   ondansetron (ZOFRAN ODT) tablet 4 mg (4 mg Oral Given 5/30/22 0939)   HYDROcodone-acetaminophen (NORCO) 5-325 mg per tablet 1 Tablet (1 Tablet Oral Given 5/30/22 0939)   lidocaine-EPINEPHrine (XYLOCAINE) 1 %-1:100,000 injection 15 mg (15 mg IntraDERMal Given by Provider 5/30/22 0950)       Differential diagnosis: Hidradenitis suppurative, abscess, cellulitis    MDM: Patient is a 80-year-old female presented ED with left armpit pain with physical exam consistent with abscess confirmed by ultrasound with I&D performed as procedure note below. Patient written short course of narcotic pain medication. Further recommendations as below. Patient driven home by spouse. Key discharge instructions and summary of care: You presented to the ED with swelling in the left armpit. Abscess was identified on ultrasound. Incision and drainage was performed. Prescription written for oral pain medication for the near term. In addition of this take naproxen 220-440 mg twice a day. You may apply heat to the wound site. It will drain for the next several days. The patient has been re-evaluated and feeling better. Patient is stable for discharge. All available radiology and laboratory results have been reviewed with patient and/or available family. Patient and/or family verbally conveyed their understanding and agreement of the patient's signs, symptoms, diagnosis, treatment and prognosis and additionally agree to follow-up as recommended in the discharge instructions or to return to the Emergency Department should their condition change or worsen prior to their follow-up appointment. All questions have been answered and patient and/or available family express understanding. IMPRESSION:  1. Abscess        DISPOSITION: Discharged    Bob Banks MD      I&D ABCESS COMP/MULTIPLE    Date/Time: 5/30/2022 12:15 PM  Performed by: Tima Olivarez MD  Authorized by: Tima Olivarez MD     Consent:     Consent obtained:  Verbal    Consent given by:  Patient    Risks discussed:  Bleeding and incomplete drainage    Alternatives discussed:  No treatment  Location:     Type:  Abscess    Location:  Upper extremity    Upper extremity location: Left axilla. Pre-procedure details:     Skin preparation:  Betadine  Anesthesia (see MAR for exact dosages):      Anesthesia method:  Local infiltration    Local anesthetic:  Lidocaine 1% WITH epi  Procedure type:     Complexity:  Complex  Procedure details:     Needle aspiration: no      Incision types:  Single straight    Incision depth:  Subcutaneous    Scalpel blade:  11    Wound management:  Probed and deloculated and irrigated with saline    Drainage:  Purulent    Drainage amount: Moderate    Wound treatment:  Wound left open    Packing materials:  None  Post-procedure details:     Patient tolerance of procedure: Tolerated well, no immediate complications  Bedside US    Date/Time: 5/30/2022 9:45 AM  Performed by: Carrol Ribeiro MD  Authorized by: Carrol Ribeiro MD     Verbal consent obtained: Yes    Given by:  Patient  Performed by: Attending  Type of procedure: Focused soft tissue  Left leg:      Indications:  Swelling, pain and redness    Skin and subcutaneous tissue:  Adequate    Cobblestoning:  Normal    Subcutaneous Collection:  Present    Interpretation:  Abscess    Abscess location:  Left axilla

## 2022-05-30 NOTE — ED TRIAGE NOTES
Ambulatory with a steady gait. States something underneath left arm pit that is painful to the touch. States it is causing two fingers in the left hand to feel numb. States first noticed it 2 days ago and has progressively gotten worse.

## 2022-05-30 NOTE — Clinical Note
[facility]  [address]  [address]  [phone]    Work/School Note    Date: 5/30/2022    To Whom It May concern:      Delmis Trujillo was seen and treated today in the emergency room by the following provider(s):  Attending Provider: Quinn Archibald MD.      Delmis Trujillo is excused from work/school on 05/30/22. She is clear to return to work/school on 05/31/22. Sincerely,          Avi Double D. Rolland Gosselin, MD

## 2022-05-30 NOTE — Clinical Note
[facility] EM[facility]  [address]  [address]  [phone]    Work/School Note    Date: 5/30/2022    To Whom It May concern:    Ana Lazaro was seen and treated today in the emergency room by the following provider(s):  Attending Provider: Gill Vázqeuz MD.      Ana Lazaro is excused from work/school on 05/30/22 and 05/31/22. She is medically clear to return to work/school on 6/1/2022. Sincerely,          Omega Dewitt MD

## 2022-05-30 NOTE — DISCHARGE INSTRUCTIONS
You presented to the ED with swelling in the left armpit. Abscess was identified on ultrasound. Incision and drainage was performed. Prescription written for oral pain medication for the near term. In addition of this take naproxen 220-440 mg twice a day. You may apply heat to the wound site. It will drain for the next several days.

## 2022-05-30 NOTE — Clinical Note
[facility]  [address]  [address]  [phone]    Work/School Note    Date: 5/30/2022    To Whom It May concern:    Leonard Clements was seen and treated today in the emergency room by the following provider(s):  Attending Provider: Brooklyn Wilson MD.      Leonard Clements is excused from work/school on 5/30/2022 through 6/1/2022. She is medically clear to return to work/school on 6/2/2022. Sincerely,          Ambreen Astorga MD

## 2022-09-07 ENCOUNTER — HOSPITAL ENCOUNTER (EMERGENCY)
Age: 37
Discharge: HOME | End: 2022-09-07
Attending: EMERGENCY MEDICINE
Payer: COMMERCIAL

## 2022-09-07 ENCOUNTER — APPOINTMENT (OUTPATIENT)
Dept: CT IMAGING | Age: 37
End: 2022-09-07
Attending: NURSE PRACTITIONER
Payer: COMMERCIAL

## 2022-09-07 VITALS
HEIGHT: 66 IN | TEMPERATURE: 97.7 F | RESPIRATION RATE: 16 BRPM | OXYGEN SATURATION: 99 % | WEIGHT: 227 LBS | SYSTOLIC BLOOD PRESSURE: 158 MMHG | HEART RATE: 65 BPM | DIASTOLIC BLOOD PRESSURE: 91 MMHG | BODY MASS INDEX: 36.48 KG/M2

## 2022-09-07 DIAGNOSIS — S06.0X0A CONCUSSION WITHOUT LOSS OF CONSCIOUSNESS, INITIAL ENCOUNTER: Primary | ICD-10-CM

## 2022-09-07 PROCEDURE — 74011250637 HC RX REV CODE- 250/637: Performed by: NURSE PRACTITIONER

## 2022-09-07 PROCEDURE — 99284 EMERGENCY DEPT VISIT MOD MDM: CPT

## 2022-09-07 PROCEDURE — 70450 CT HEAD/BRAIN W/O DYE: CPT

## 2022-09-07 RX ORDER — ONDANSETRON 4 MG/1
8 TABLET, ORALLY DISINTEGRATING ORAL
Status: COMPLETED | OUTPATIENT
Start: 2022-09-07 | End: 2022-09-07

## 2022-09-07 RX ORDER — HYDROXYZINE 50 MG/1
50 TABLET, FILM COATED ORAL
Qty: 20 TABLET | Refills: 0 | Status: SHIPPED | OUTPATIENT
Start: 2022-09-07 | End: 2022-09-17

## 2022-09-07 RX ORDER — KETOROLAC TROMETHAMINE 10 MG/1
10 TABLET, FILM COATED ORAL
Qty: 20 TABLET | Refills: 0 | Status: SHIPPED | OUTPATIENT
Start: 2022-09-07

## 2022-09-07 RX ORDER — ONDANSETRON 8 MG/1
8 TABLET, ORALLY DISINTEGRATING ORAL
Qty: 12 TABLET | Refills: 0 | Status: SHIPPED | OUTPATIENT
Start: 2022-09-07

## 2022-09-07 RX ORDER — BUTALBITAL, ACETAMINOPHEN AND CAFFEINE 50; 325; 40 MG/1; MG/1; MG/1
2 TABLET ORAL
Status: COMPLETED | OUTPATIENT
Start: 2022-09-07 | End: 2022-09-07

## 2022-09-07 RX ORDER — BUTALBITAL, ACETAMINOPHEN AND CAFFEINE 300; 40; 50 MG/1; MG/1; MG/1
1 CAPSULE ORAL
Qty: 18 CAPSULE | Refills: 0 | Status: SHIPPED | OUTPATIENT
Start: 2022-09-07

## 2022-09-07 RX ADMIN — ONDANSETRON 8 MG: 4 TABLET, ORALLY DISINTEGRATING ORAL at 15:59

## 2022-09-07 RX ADMIN — BUTALBITAL, ACETAMINOPHEN, AND CAFFEINE 2 TABLET: 50; 325; 40 TABLET ORAL at 15:59

## 2022-09-07 NOTE — ED TRIAGE NOTES
Patient ambulatory through triage with complaints of anterior head pain after she fell backwards onto her head on Monday. She denies any loc, thinners, or memory loss. She does endorse initial vision changes after the injury but that has resolved. She also endorses nausea wo vomiting, she has no hx of concussions.

## 2022-09-07 NOTE — Clinical Note
Camden Luu was seen and treated in our emergency department on 9/7/2022.     Huey Ku can return to work starting on 9/12/22    Simba Downs NP

## 2022-09-07 NOTE — ED PROVIDER NOTES
43-year-old female with no pertinent medical history presents the ER today for evaluation of posttraumatic headache. Patient states that she was on a slip and slide this past Monday when she fell backwards and hit the posterior aspect of her scalp against the ground forcefully. No LOC. She reports \"seeing stars\" for a few seconds after the injury. Since then, she has had fatigue, severe headache, nausea, decreased concentration, insomnia, dizziness, and photophobia. She denies any sensorimotor disturbances, neck pain/stiffness, amnesia, use of anticoagulants, vomiting. Past Medical History:   Diagnosis Date    Heart murmur     History of      Preeclampsia        Past Surgical History:   Procedure Laterality Date    HX BREAST REDUCTION Bilateral 10/02/2018    HX  SECTION      HX TUBAL LIGATION Bilateral 2015    HX WISDOM TEETH EXTRACTION           Family History:   Problem Relation Age of Onset    Hypertension Mother     Diabetes Father     OSTEOARTHRITIS Maternal Aunt     Stroke Maternal Grandmother        Social History     Socioeconomic History    Marital status:      Spouse name: Not on file    Number of children: Not on file    Years of education: Not on file    Highest education level: Not on file   Occupational History    Not on file   Tobacco Use    Smoking status: Never    Smokeless tobacco: Never   Substance and Sexual Activity    Alcohol use:  Yes     Alcohol/week: 0.0 standard drinks     Types: 1 - 2 Standard drinks or equivalent per week     Comment: RARE    Drug use: No    Sexual activity: Yes     Partners: Male     Birth control/protection: None, Pill, Surgical     Comment:    Other Topics Concern    Not on file   Social History Narrative    Not on file     Social Determinants of Health     Financial Resource Strain: Not on file   Food Insecurity: Not on file   Transportation Needs: Not on file   Physical Activity: Not on file   Stress: Not on file Social Connections: Not on file   Intimate Partner Violence: Not on file   Housing Stability: Not on file         ALLERGIES: Percocet [oxycodone-acetaminophen] and Prednisone    Review of Systems   Constitutional:  Positive for fatigue. Gastrointestinal:  Positive for nausea. Neurological:  Positive for headaches. Psychiatric/Behavioral:  Positive for decreased concentration. The patient is nervous/anxious. All other systems reviewed and are negative. Vitals:    09/07/22 1424   BP: (!) 158/91   Pulse: 65   Resp: 16   Temp: 97.7 °F (36.5 °C)   SpO2: 99%   Weight: 103 kg (227 lb)   Height: 5' 6\" (1.676 m)            Physical Exam  Vitals and nursing note reviewed. Constitutional:       General: She is not in acute distress. Appearance: Normal appearance. She is not ill-appearing. HENT:      Head: Normocephalic and atraumatic. No raccoon eyes or Bond's sign. Jaw: There is normal jaw occlusion. Comments: Tenderness to palpation of posterior scalp     Nose: Nose normal.   Eyes:      General: Lids are normal. Vision grossly intact. No scleral icterus. Extraocular Movements: Extraocular movements intact. Right eye: Normal extraocular motion and no nystagmus. Left eye: Normal extraocular motion and no nystagmus. Pupils: Pupils are equal, round, and reactive to light. Cardiovascular:      Rate and Rhythm: Normal rate and regular rhythm. Pulmonary:      Effort: Pulmonary effort is normal.   Abdominal:      General: There is no distension. Tenderness: There is no guarding. Musculoskeletal:         General: Normal range of motion. Cervical back: Normal range of motion and neck supple. Skin:     General: Skin is warm and dry. Neurological:      Mental Status: She is alert and oriented to person, place, and time. Mental status is at baseline. Sensory: Sensation is intact. Motor: Motor function is intact. Coordination: Coordination is intact. Psychiatric:         Mood and Affect: Mood normal.         Behavior: Behavior normal.         Thought Content: Thought content normal.         Judgment: Judgment normal.        MDM     Amount and/or Complexity of Data Reviewed  Tests in the radiology section of CPT®: reviewed       VITAL SIGNS:  Patient Vitals for the past 4 hrs:   Temp Pulse Resp BP SpO2   09/07/22 1424 97.7 °F (36.5 °C) 65 16 (!) 158/91 99 %         LABS:  No results found for this or any previous visit (from the past 6 hour(s)). IMAGING:  CT HEAD WO CONT   Final Result   Normal study            Medications During Visit:  Medications   ondansetron (ZOFRAN ODT) tablet 8 mg (8 mg Oral Given 9/7/22 1559)   butalbital-acetaminophen-caffeine (FIORICET, ESGIC) -40 mg per tablet 2 Tablet (2 Tablets Oral Given 9/7/22 1559)         DECISION MAKING:  Geovanni Fritz is a 40 y.o. female who comes in as above. CT scan not indicated based on [place] head CT rules. I discussed the option to treat symptomatically and have her follow-up with the concussion clinic, but she expressed some anxiety and preferred to have neuroimaging for her symptoms, which I thought was reasonable. CT scan unremarkable for traumatic abnormalities. I discussed possibility developing postconcussive syndrome as well as supportive care measures for her concussive symptoms. She is encouraged to follow-up with the concussion care center for at least 1 evaluation. The clinical decision making for this encounter included ordering and interpreting the above diagnostic tests with comparison to prior studies that are within our EMR. Past medical and surgical histories were reviewed, as were records from recent outpatient and emergency department visits. The above results discussed and reviewed with the patient.   Patient verbalized understanding of the care plan, including any changes to current outpatient medication regimen, discussed disease process, symptom control, and follow-up care. Return precautions reviewed. IMPRESSION:  1. Concussion without loss of consciousness, initial encounter        DISPOSITION:  Discharged      Current Discharge Medication List        START taking these medications    Details   butalbital-acetaminophen-caff (Fioricet) -40 mg per capsule Take 1 Capsule by mouth every six (6) hours as needed for Headache. Qty: 18 Capsule, Refills: 0  Start date: 9/7/2022      ketorolac (TORADOL) 10 mg tablet Take 1 Tablet by mouth every six (6) hours as needed for Pain. Qty: 20 Tablet, Refills: 0  Start date: 9/7/2022      hydrOXYzine HCL (ATARAX) 50 mg tablet Take 1 Tablet by mouth every six (6) hours as needed for Sleep (Dizziness) for up to 10 days. Qty: 20 Tablet, Refills: 0  Start date: 9/7/2022, End date: 9/17/2022      ondansetron (ZOFRAN ODT) 8 mg disintegrating tablet Take 1 Tablet by mouth every eight (8) hours as needed for Nausea or Vomiting. Qty: 12 Tablet, Refills: 0  Start date: 9/7/2022              Follow-up Information       Follow up With Specialties Details Why [address] [facility]  Schedule an appointment as soon as possible for a visit   [address]  [phone]              The patient is asked to follow-up with their primary care provider in the next several days. They are to call tomorrow for an appointment. The patient is asked to return promptly for any increased concerns or worsening of symptoms. They can return to this emergency department or any other emergency department.       Procedures

## 2022-09-07 NOTE — Clinical Note
Gunner Aceves was seen and treated in our emergency department on 9/7/2022.     Angelica Dilcia can return to work starting on 9/12/22    Blaine Bah NP

## 2023-03-29 ENCOUNTER — HOSPITAL ENCOUNTER (OUTPATIENT)
Dept: GENERAL RADIOLOGY | Age: 38
Discharge: HOME | End: 2023-03-29

## 2023-03-29 ENCOUNTER — OFFICE VISIT (OUTPATIENT)
Dept: INTERNAL MEDICINE CLINIC | Age: 38
End: 2023-03-29
Payer: COMMERCIAL

## 2023-03-29 VITALS
HEART RATE: 66 BPM | WEIGHT: 201 LBS | DIASTOLIC BLOOD PRESSURE: 80 MMHG | SYSTOLIC BLOOD PRESSURE: 121 MMHG | HEIGHT: 66 IN | BODY MASS INDEX: 32.3 KG/M2 | OXYGEN SATURATION: 96 % | TEMPERATURE: 98 F | RESPIRATION RATE: 16 BRPM

## 2023-03-29 DIAGNOSIS — M25.562 CHRONIC PAIN OF LEFT KNEE: ICD-10-CM

## 2023-03-29 DIAGNOSIS — E55.9 VITAMIN D DEFICIENCY: ICD-10-CM

## 2023-03-29 DIAGNOSIS — G89.29 CHRONIC PAIN OF LEFT KNEE: ICD-10-CM

## 2023-03-29 DIAGNOSIS — G89.29 CHRONIC PAIN OF LEFT ANKLE: ICD-10-CM

## 2023-03-29 DIAGNOSIS — M25.572 CHRONIC PAIN OF LEFT ANKLE: ICD-10-CM

## 2023-03-29 DIAGNOSIS — E66.9 OBESITY (BMI 30-39.9): ICD-10-CM

## 2023-03-29 DIAGNOSIS — Z00.00 PHYSICAL EXAM: Primary | ICD-10-CM

## 2023-03-29 PROBLEM — F41.9 ANXIETY AND DEPRESSION: Status: RESOLVED | Noted: 2018-01-16 | Resolved: 2023-03-29

## 2023-03-29 PROBLEM — F32.A ANXIETY AND DEPRESSION: Status: RESOLVED | Noted: 2018-01-16 | Resolved: 2023-03-29

## 2023-03-29 PROCEDURE — 99395 PREV VISIT EST AGE 18-39: CPT | Performed by: INTERNAL MEDICINE

## 2023-03-30 LAB
25(OH)D3 SERPL-MCNC: 26.2 NG/ML (ref 30–100)
ALBUMIN SERPL-MCNC: 4.1 G/DL (ref 3.5–5)
ALBUMIN/GLOB SERPL: 1.2 (ref 1.1–2.2)
ALP SERPL-CCNC: 52 U/L (ref 45–117)
ALT SERPL-CCNC: 24 U/L (ref 12–78)
ANION GAP SERPL CALC-SCNC: 3 MMOL/L (ref 5–15)
AST SERPL-CCNC: 17 U/L (ref 15–37)
BILIRUB SERPL-MCNC: 0.3 MG/DL (ref 0.2–1)
BUN SERPL-MCNC: 10 MG/DL (ref 6–20)
BUN/CREAT SERPL: 13 (ref 12–20)
CALCIUM SERPL-MCNC: 9.7 MG/DL (ref 8.5–10.1)
CHLORIDE SERPL-SCNC: 108 MMOL/L (ref 97–108)
CHOLEST SERPL-MCNC: 157 MG/DL
CO2 SERPL-SCNC: 28 MMOL/L (ref 21–32)
CREAT SERPL-MCNC: 0.77 MG/DL (ref 0.55–1.02)
ERYTHROCYTE [DISTWIDTH] IN BLOOD BY AUTOMATED COUNT: 13 % (ref 11.5–14.5)
EST. AVERAGE GLUCOSE BLD GHB EST-MCNC: 114 MG/DL
GLOBULIN SER CALC-MCNC: 3.3 G/DL (ref 2–4)
GLUCOSE SERPL-MCNC: 99 MG/DL (ref 65–100)
HBA1C MFR BLD: 5.6 % (ref 4–5.6)
HCT VFR BLD AUTO: 36.3 % (ref 35–47)
HCV AB SERPL QL IA: NONREACTIVE
HDLC SERPL-MCNC: 58 MG/DL
HDLC SERPL: 2.7 (ref 0–5)
HGB BLD-MCNC: 12.4 G/DL (ref 11.5–16)
LDLC SERPL CALC-MCNC: 87.2 MG/DL (ref 0–100)
MCH RBC QN AUTO: 28.6 PG (ref 26–34)
MCHC RBC AUTO-ENTMCNC: 34.2 G/DL (ref 30–36.5)
MCV RBC AUTO: 83.6 FL (ref 80–99)
NRBC # BLD: 0 K/UL (ref 0–0.01)
NRBC BLD-RTO: 0 PER 100 WBC
PLATELET # BLD AUTO: 268 K/UL (ref 150–400)
PMV BLD AUTO: 10.2 FL (ref 8.9–12.9)
POTASSIUM SERPL-SCNC: 4.1 MMOL/L (ref 3.5–5.1)
PROT SERPL-MCNC: 7.4 G/DL (ref 6.4–8.2)
RBC # BLD AUTO: 4.34 M/UL (ref 3.8–5.2)
SODIUM SERPL-SCNC: 139 MMOL/L (ref 136–145)
TRIGL SERPL-MCNC: 59 MG/DL (ref ?–150)
TSH SERPL DL<=0.05 MIU/L-ACNC: 0.91 UIU/ML (ref 0.36–3.74)
VLDLC SERPL CALC-MCNC: 11.8 MG/DL
WBC # BLD AUTO: 7.3 K/UL (ref 3.6–11)

## 2023-03-30 NOTE — PROGRESS NOTES
Work on diet and exercise as discussed in clinic  Take over the counter vitamin D 2,000units daily for mildly low vitamin D

## 2024-10-08 NOTE — PROGRESS NOTES
HISTORY OF PRESENT ILLNESS  Jimbo Aponte is a 39 y.o. female.  HPI  Last here 3/29/23. Pt is here for routine care.     She notes a spot of discoloration on her L breast.  Not noticeable here today she states she has not noticed any lumps during her self exams but wants it to be looked at. Breast exam was normal and there were no noted spots of discoloration except around her breast reduction scars.      BP today is 114/78    Wt today is 171 lbs, down 30 lbs since lov   Congratulated on wt/l, her weight has been holding since coming off the zepbound   She was on zepbound from 1/24-6/24, highest dose was 7.5mg         Reviewed labs  Ordered labs        Pt saw a KEYLA Arreola (derm) on [address]  Pt was given spironolactone 50mg per derm for acne but she is no longer taking this        Pt had breast reduction surg 10/2/18  Pt has finished seeing the surgeon      Previously, pt had difficulties after her sister was murdered on New Years [place]   Not currently on any meds for depression, she states overall she is doing well, has some symptoms but wishes to stay off medication   She does not want to go on anything that could affect libido.  She prev was on lexapro also at 1 point on BuSpar and Ativan  Recall zoloft helped but caused decreased libido, failed wellbutrin and effexor     Previously had amitriptyline 25mg for sleep   Had previously been taking ambien prn, not anymore     She is not taking vit D 2000U  Discussed getting back on this again, she states she will        PREVENTIVE:  Colonoscopy: not yet needed  Pap: 3/23 Dr. Hernandez nl this is due  Mammogram: not yet needed will be due this summer age 40 reminded her  Dexa: not yet needed  Tdap: 01/16/18   Pneumovax: not yet needed  Shingrix: not yet needed  Flu shot: 10/22, declines   Eye exam: Dr. Bloom, summer 2018, q 2 years, due reminded   A1C: 1/19 5.5 3/23 5.6   Lipids: LDL 3/23 87  Hep C screen: 3/23 neg  COVID: 3 doses (Pfizer), last one 10/22 declines

## 2024-10-15 ENCOUNTER — OFFICE VISIT (OUTPATIENT)
Age: 39
End: 2024-10-15
Payer: COMMERCIAL

## 2024-10-15 VITALS
RESPIRATION RATE: 16 BRPM | SYSTOLIC BLOOD PRESSURE: 114 MMHG | HEART RATE: 73 BPM | BODY MASS INDEX: 27.48 KG/M2 | OXYGEN SATURATION: 99 % | WEIGHT: 171 LBS | DIASTOLIC BLOOD PRESSURE: 78 MMHG | HEIGHT: 66 IN | TEMPERATURE: 98 F

## 2024-10-15 DIAGNOSIS — F41.8 MIXED ANXIETY AND DEPRESSIVE DISORDER: ICD-10-CM

## 2024-10-15 DIAGNOSIS — Z00.00 PHYSICAL EXAM: Primary | ICD-10-CM

## 2024-10-15 PROCEDURE — 99395 PREV VISIT EST AGE 18-39: CPT | Performed by: INTERNAL MEDICINE

## 2024-10-15 SDOH — ECONOMIC STABILITY: FOOD INSECURITY: WITHIN THE PAST 12 MONTHS, YOU WORRIED THAT YOUR FOOD WOULD RUN OUT BEFORE YOU GOT MONEY TO BUY MORE.: NEVER TRUE

## 2024-10-15 SDOH — ECONOMIC STABILITY: INCOME INSECURITY: HOW HARD IS IT FOR YOU TO PAY FOR THE VERY BASICS LIKE FOOD, HOUSING, MEDICAL CARE, AND HEATING?: NOT HARD AT ALL

## 2024-10-15 SDOH — ECONOMIC STABILITY: FOOD INSECURITY: WITHIN THE PAST 12 MONTHS, THE FOOD YOU BOUGHT JUST DIDN'T LAST AND YOU DIDN'T HAVE MONEY TO GET MORE.: NEVER TRUE

## 2024-10-15 ASSESSMENT — PATIENT HEALTH QUESTIONNAIRE - PHQ9
1. LITTLE INTEREST OR PLEASURE IN DOING THINGS: NOT AT ALL
SUM OF ALL RESPONSES TO PHQ QUESTIONS 1-9: 0
SUM OF ALL RESPONSES TO PHQ9 QUESTIONS 1 & 2: 0
SUM OF ALL RESPONSES TO PHQ QUESTIONS 1-9: 0
2. FEELING DOWN, DEPRESSED OR HOPELESS: NOT AT ALL

## 2024-10-16 LAB
ALBUMIN SERPL-MCNC: 4 G/DL (ref 3.5–5)
ALBUMIN/GLOB SERPL: 1.1 (ref 1.1–2.2)
ALP SERPL-CCNC: 51 U/L (ref 45–117)
ALT SERPL-CCNC: 16 U/L (ref 12–78)
ANION GAP SERPL CALC-SCNC: 3 MMOL/L (ref 2–12)
AST SERPL-CCNC: 14 U/L (ref 15–37)
BILIRUB SERPL-MCNC: 0.4 MG/DL (ref 0.2–1)
BUN SERPL-MCNC: 13 MG/DL (ref 6–20)
BUN/CREAT SERPL: 16 (ref 12–20)
CALCIUM SERPL-MCNC: 9.3 MG/DL (ref 8.5–10.1)
CHLORIDE SERPL-SCNC: 108 MMOL/L (ref 97–108)
CHOLEST SERPL-MCNC: 190 MG/DL
CO2 SERPL-SCNC: 28 MMOL/L (ref 21–32)
CREAT SERPL-MCNC: 0.8 MG/DL (ref 0.55–1.02)
ERYTHROCYTE [DISTWIDTH] IN BLOOD BY AUTOMATED COUNT: 12.6 % (ref 11.5–14.5)
EST. AVERAGE GLUCOSE BLD GHB EST-MCNC: 97 MG/DL
GLOBULIN SER CALC-MCNC: 3.5 G/DL (ref 2–4)
GLUCOSE SERPL-MCNC: 86 MG/DL (ref 65–100)
HBA1C MFR BLD: 5 % (ref 4–5.6)
HCT VFR BLD AUTO: 37.8 % (ref 35–47)
HDLC SERPL-MCNC: 73 MG/DL
HDLC SERPL: 2.6 (ref 0–5)
HGB BLD-MCNC: 12.9 G/DL (ref 11.5–16)
HIV 1+2 AB+HIV1 P24 AG SERPL QL IA: NONREACTIVE
HIV 1/2 RESULT COMMENT: NORMAL
LDLC SERPL CALC-MCNC: 109.2 MG/DL (ref 0–100)
MCH RBC QN AUTO: 28.5 PG (ref 26–34)
MCHC RBC AUTO-ENTMCNC: 34.1 G/DL (ref 30–36.5)
MCV RBC AUTO: 83.6 FL (ref 80–99)
NRBC # BLD: 0 K/UL (ref 0–0.01)
NRBC BLD-RTO: 0 PER 100 WBC
PLATELET # BLD AUTO: 242 K/UL (ref 150–400)
PMV BLD AUTO: 10.7 FL (ref 8.9–12.9)
POTASSIUM SERPL-SCNC: 4.1 MMOL/L (ref 3.5–5.1)
PROT SERPL-MCNC: 7.5 G/DL (ref 6.4–8.2)
RBC # BLD AUTO: 4.52 M/UL (ref 3.8–5.2)
SODIUM SERPL-SCNC: 139 MMOL/L (ref 136–145)
TRIGL SERPL-MCNC: 39 MG/DL
TSH SERPL DL<=0.05 MIU/L-ACNC: 0.49 UIU/ML (ref 0.36–3.74)
VLDLC SERPL CALC-MCNC: 7.8 MG/DL
WBC # BLD AUTO: 5.2 K/UL (ref 3.6–11)

## 2025-02-24 ASSESSMENT — ENCOUNTER SYMPTOMS: SHORTNESS OF BREATH: 0

## 2025-02-24 NOTE — PROGRESS NOTES
5.2 10/15/2024    HGB 12.9 10/15/2024    HCT 37.8 10/15/2024    MCV 83.6 10/15/2024     10/15/2024     Lab Results   Component Value Date    CHOL 190 10/15/2024    TRIG 39 10/15/2024    HDL 73 10/15/2024     Lab Results   Component Value Date    CREATININE 0.80 10/15/2024    BUN 13 10/15/2024     10/15/2024    K 4.1 10/15/2024     10/15/2024    CO2 28 10/15/2024       Review of Systems   Respiratory:  Negative for shortness of breath.    Cardiovascular:  Positive for palpitations. Negative for chest pain.         Physical Exam  Constitutional:       General: She is not in acute distress.     Appearance: Normal appearance.   HENT:      Head: Normocephalic and atraumatic.   Eyes:      General:         Right eye: No discharge.         Left eye: No discharge.   Pulmonary:      Effort: Pulmonary effort is normal. No respiratory distress.   Neurological:      Mental Status: She is alert and oriented to person, place, and time. Mental status is at baseline.   Psychiatric:         Mood and Affect: Mood normal.           ASSESSMENT and PLAN   Diagnosis Orders   1. Palpitation  Cardiac event/MCOT monitor    TSH    T4, Free   Patient with sporadic palpitations generally brief in etiology    Will check basic labs will get event monitor    Given that she has had some slower heart rates we will hold off on starting Toprol-XL for now depending on event monitor results or how frequent see of symptoms progress may need to consider adding this next CBC    Basic Metabolic Panel      2. Mixed anxiety and depressive disorder     Currently doing well without medication has been a little anxious when she gets her palpitations ultimately if getting more difficulty with acute anxiety related to palpitations would consider adding hydroxyzine as needed for now hold off on additional medication       3. Obesity (BMI 30-39.9)  Semaglutide-Weight Management (WEGOVY) 0.25 MG/0.5ML SOAJ SC injection   Patient would like to

## 2025-02-27 SDOH — ECONOMIC STABILITY: TRANSPORTATION INSECURITY
IN THE PAST 12 MONTHS, HAS LACK OF TRANSPORTATION KEPT YOU FROM MEETINGS, WORK, OR FROM GETTING THINGS NEEDED FOR DAILY LIVING?: PATIENT DECLINED

## 2025-02-27 SDOH — ECONOMIC STABILITY: FOOD INSECURITY: WITHIN THE PAST 12 MONTHS, THE FOOD YOU BOUGHT JUST DIDN'T LAST AND YOU DIDN'T HAVE MONEY TO GET MORE.: PATIENT DECLINED

## 2025-02-27 SDOH — ECONOMIC STABILITY: FOOD INSECURITY: WITHIN THE PAST 12 MONTHS, YOU WORRIED THAT YOUR FOOD WOULD RUN OUT BEFORE YOU GOT MONEY TO BUY MORE.: PATIENT DECLINED

## 2025-02-27 SDOH — ECONOMIC STABILITY: INCOME INSECURITY: IN THE LAST 12 MONTHS, WAS THERE A TIME WHEN YOU WERE NOT ABLE TO PAY THE MORTGAGE OR RENT ON TIME?: PATIENT DECLINED

## 2025-02-27 SDOH — ECONOMIC STABILITY: TRANSPORTATION INSECURITY
IN THE PAST 12 MONTHS, HAS THE LACK OF TRANSPORTATION KEPT YOU FROM MEDICAL APPOINTMENTS OR FROM GETTING MEDICATIONS?: PATIENT DECLINED

## 2025-02-28 ENCOUNTER — TELEMEDICINE (OUTPATIENT)
Age: 40
End: 2025-02-28
Payer: COMMERCIAL

## 2025-02-28 DIAGNOSIS — R00.2 PALPITATIONS: ICD-10-CM

## 2025-02-28 DIAGNOSIS — F41.8 MIXED ANXIETY AND DEPRESSIVE DISORDER: ICD-10-CM

## 2025-02-28 DIAGNOSIS — R00.2 PALPITATION: Primary | ICD-10-CM

## 2025-02-28 DIAGNOSIS — E66.9 OBESITY (BMI 30-39.9): ICD-10-CM

## 2025-02-28 PROCEDURE — 99214 OFFICE O/P EST MOD 30 MIN: CPT | Performed by: INTERNAL MEDICINE

## 2025-02-28 ASSESSMENT — PATIENT HEALTH QUESTIONNAIRE - PHQ9
1. LITTLE INTEREST OR PLEASURE IN DOING THINGS: NOT AT ALL
SUM OF ALL RESPONSES TO PHQ9 QUESTIONS 1 & 2: 0
SUM OF ALL RESPONSES TO PHQ QUESTIONS 1-9: 0
2. FEELING DOWN, DEPRESSED OR HOPELESS: NOT AT ALL
SUM OF ALL RESPONSES TO PHQ QUESTIONS 1-9: 0

## 2025-06-03 ENCOUNTER — OFFICE VISIT (OUTPATIENT)
Age: 40
End: 2025-06-03
Payer: COMMERCIAL

## 2025-06-03 VITALS
BODY MASS INDEX: 28.85 KG/M2 | DIASTOLIC BLOOD PRESSURE: 71 MMHG | WEIGHT: 179.5 LBS | HEIGHT: 66 IN | HEART RATE: 82 BPM | SYSTOLIC BLOOD PRESSURE: 115 MMHG | TEMPERATURE: 97.6 F | OXYGEN SATURATION: 100 %

## 2025-06-03 DIAGNOSIS — E66.3 OVERWEIGHT: Primary | ICD-10-CM

## 2025-06-03 DIAGNOSIS — Z00.00 PHYSICAL EXAM: ICD-10-CM

## 2025-06-03 DIAGNOSIS — R00.2 PALPITATION: ICD-10-CM

## 2025-06-03 PROCEDURE — 99213 OFFICE O/P EST LOW 20 MIN: CPT | Performed by: INTERNAL MEDICINE

## 2025-06-03 ASSESSMENT — ENCOUNTER SYMPTOMS: SHORTNESS OF BREATH: 0

## 2025-06-03 ASSESSMENT — PATIENT HEALTH QUESTIONNAIRE - PHQ9
SUM OF ALL RESPONSES TO PHQ QUESTIONS 1-9: 0
1. LITTLE INTEREST OR PLEASURE IN DOING THINGS: NOT AT ALL
SUM OF ALL RESPONSES TO PHQ QUESTIONS 1-9: 0
2. FEELING DOWN, DEPRESSED OR HOPELESS: NOT AT ALL

## 2025-06-03 NOTE — PROGRESS NOTES
HISTORY OF PRESENT ILLNESS  Jimbo Aponte is a 39 y.o. female.  HPI    Lov vv 2/28/2025. Patient is here for documentation for nursing school.      BP today: 115/71  BP was 154/82 at         Wt today is 179 lbs, lov 171 lbs  She was on zepbound from 1/24-6/24, highest dose was 7.5mg   This is no longer covered she has started to gain weight back since being off the zepbound   In 2023 her initial weight was 200lbs she was able to get her weight down to 171 lbs on the zepbound will try to get wegovy covered start on 0.25mg weekly dose and titrate up as tolerated          Reviewed labs  Ordered labs    Pt needs MMR titers for nursing school        Pt saw a KEYLA Arreola (derm) on [address]  Pt was given spironolactone 50mg per derm for acne but she is no longer taking this        Previously, she stated she had 30-1minute episodes of palpitations, she had a warm sensation, felt like she was going to pass out, then got anxious and started having sob which she attributes to the anxiety. She states her palpitations were happening every other day, last under a minute normally and resolve. Started by ordering 14 day event monitor, she was told she would also need to see cardiology if sx worsened or became more frequent. She went to  2/21/25 for increased heart rate, warm feeling, and sob. As of 6/3/25, she has had no more palpitations.  She did not complete the event monitor     Pt had breast reduction surg 10/2/18  Pt has finished seeing the surgeon        Previously, pt had difficulties after her sister was murdered on New Years [place]     Not currently on any meds for depression, she states overall she is doing well, has some symptoms but wishes to stay off medication   She does not want to go on anything that could affect libido.  She prev was on lexapro also at 1 point on BuSpar and Ativan  Recall zoloft helped but caused decreased libido, failed wellbutrin and effexor     Previously had amitriptyline 25mg for sleep 
HISTORY OF PRESENT ILLNESS  Jimbo Aponte is a 39 y.o. female.  HPI    This is an established visit completed with telemedicine was completed with video assist. The patient acknowledges and agrees to this method of visitation.    Lov vv 2/28/2025. Patient is here for documentation for nursing school.        BP was 154/82 at    BP in clinic 114/78 10/24     Wt today is xx lbs per pt, lov 171 lbs  She was on zepbound from 1/24-6/24, highest dose was 7.5mg   This is no longer covered she has started to gain weight back since being off the zepbound   In 2023 her initial weight was 200lbs she was able to get her weight down to 171 lbs on the zepbound will try to get wegovy covered start on 0.25mg weekly dose and titrate up as tolerated          Reviewed labs  Ordered labs        Pt saw a KEYLA Arreola (derm) on [address]  Pt was given spironolactone 50mg per derm for acne but she is no longer taking this        Previously, she stated she had 30-1minute episodes of palpitations, she had a warm sensation, felt like she was going to pass out, then got anxious and started having sob which she attributes to the anxiety. She states her palpitations were happening every other day, last under a minute normally and resolve. Started with 14 day event monitor, she will also need to see cardiology if sx worsen or become more frequent. She went to  2/21/25 for increased heart rate, warm feeling, and sob.     Pt had breast reduction surg 10/2/18  Pt has finished seeing the surgeon        Previously, pt had difficulties after her sister was murdered on New Years [place]   Not currently on any meds for depression, she states overall she is doing well, has some symptoms but wishes to stay off medication   She does not want to go on anything that could affect libido.  She prev was on lexapro also at 1 point on BuSpar and Ativan  Recall zoloft helped but caused decreased libido, failed wellbutrin and effexor     Previously had amitriptyline 
1-2 cups/cans per day

## 2025-06-04 ENCOUNTER — RESULTS FOLLOW-UP (OUTPATIENT)
Age: 40
End: 2025-06-04

## 2025-06-04 LAB
ALBUMIN SERPL-MCNC: 4.1 G/DL (ref 3.5–5)
ALBUMIN/GLOB SERPL: 1.3 (ref 1.1–2.2)
ALP SERPL-CCNC: 48 U/L (ref 45–117)
ALT SERPL-CCNC: 24 U/L (ref 12–78)
ANION GAP SERPL CALC-SCNC: 5 MMOL/L (ref 2–12)
AST SERPL-CCNC: 19 U/L (ref 15–37)
BILIRUB SERPL-MCNC: 0.3 MG/DL (ref 0.2–1)
BUN SERPL-MCNC: 12 MG/DL (ref 6–20)
BUN/CREAT SERPL: 15 (ref 12–20)
CALCIUM SERPL-MCNC: 9.5 MG/DL (ref 8.5–10.1)
CHLORIDE SERPL-SCNC: 107 MMOL/L (ref 97–108)
CO2 SERPL-SCNC: 28 MMOL/L (ref 21–32)
CREAT SERPL-MCNC: 0.8 MG/DL (ref 0.55–1.02)
ERYTHROCYTE [DISTWIDTH] IN BLOOD BY AUTOMATED COUNT: 12.6 % (ref 11.5–14.5)
GLOBULIN SER CALC-MCNC: 3.1 G/DL (ref 2–4)
GLUCOSE SERPL-MCNC: 80 MG/DL (ref 65–100)
HBV SURFACE AB SER QL: REACTIVE
HBV SURFACE AB SER-ACNC: 95.09 MIU/ML
HCT VFR BLD AUTO: 35.9 % (ref 35–47)
HGB BLD-MCNC: 11.7 G/DL (ref 11.5–16)
MCH RBC QN AUTO: 28.3 PG (ref 26–34)
MCHC RBC AUTO-ENTMCNC: 32.6 G/DL (ref 30–36.5)
MCV RBC AUTO: 86.7 FL (ref 80–99)
NRBC # BLD: 0 K/UL (ref 0–0.01)
NRBC BLD-RTO: 0 PER 100 WBC
PLATELET # BLD AUTO: 272 K/UL (ref 150–400)
PMV BLD AUTO: 10.2 FL (ref 8.9–12.9)
POTASSIUM SERPL-SCNC: 4 MMOL/L (ref 3.5–5.1)
PROT SERPL-MCNC: 7.2 G/DL (ref 6.4–8.2)
RBC # BLD AUTO: 4.14 M/UL (ref 3.8–5.2)
SODIUM SERPL-SCNC: 140 MMOL/L (ref 136–145)
TSH SERPL DL<=0.05 MIU/L-ACNC: 0.71 UIU/ML (ref 0.36–3.74)
WBC # BLD AUTO: 5.8 K/UL (ref 3.6–11)

## 2025-06-06 LAB
MEV IGG SER IA-ACNC: >300 AU/ML
MUV IGG SER IA-ACNC: 292 AU/ML
RUBV IGG SERPL IA-ACNC: 5.4 INDEX
VZV IGG SER IA-ACNC: REACTIVE
VZV IGM SER IA-ACNC: <0.91 INDEX (ref 0–0.9)

## 2025-06-08 LAB
GAMMA INTERFERON BACKGROUND BLD IA-ACNC: 0.04 IU/ML
M TB IFN-G BLD-IMP: NEGATIVE
M TB IFN-G CD4+ BCKGRND COR BLD-ACNC: 0.13 IU/ML
M TB IFN-G CD4+CD8+ BCKGRND COR BLD-ACNC: 0.06 IU/ML
MITOGEN IGNF BCKGRD COR BLD-ACNC: >10 IU/ML
QUANTIFERON, INCUBATION: NORMAL
SERVICE CMNT-IMP: NORMAL

## 2025-06-20 ENCOUNTER — TELEPHONE (OUTPATIENT)
Age: 40
End: 2025-06-20

## 2025-06-20 NOTE — TELEPHONE ENCOUNTER
Left generic message. Attempted to let patient know that their forms for [facility] are ready for . The forms will be at the front.